# Patient Record
Sex: FEMALE | Race: WHITE | NOT HISPANIC OR LATINO
[De-identification: names, ages, dates, MRNs, and addresses within clinical notes are randomized per-mention and may not be internally consistent; named-entity substitution may affect disease eponyms.]

---

## 2018-10-11 ENCOUNTER — TRANSCRIPTION ENCOUNTER (OUTPATIENT)
Age: 63
End: 2018-10-11

## 2018-11-29 ENCOUNTER — RECORD ABSTRACTING (OUTPATIENT)
Age: 63
End: 2018-11-29

## 2018-11-29 DIAGNOSIS — Z80.7 FAMILY HISTORY OF OTHER MALIGNANT NEOPLASMS OF LYMPHOID, HEMATOPOIETIC AND RELATED TISSUES: ICD-10-CM

## 2018-11-29 DIAGNOSIS — F43.9 REACTION TO SEVERE STRESS, UNSPECIFIED: ICD-10-CM

## 2018-11-29 DIAGNOSIS — Z80.8 FAMILY HISTORY OF MALIGNANT NEOPLASM OF OTHER ORGANS OR SYSTEMS: ICD-10-CM

## 2018-12-17 ENCOUNTER — APPOINTMENT (OUTPATIENT)
Dept: INTERNAL MEDICINE | Facility: CLINIC | Age: 63
End: 2018-12-17
Payer: COMMERCIAL

## 2018-12-17 ENCOUNTER — NON-APPOINTMENT (OUTPATIENT)
Age: 63
End: 2018-12-17

## 2018-12-17 VITALS
DIASTOLIC BLOOD PRESSURE: 80 MMHG | WEIGHT: 150.1 LBS | HEIGHT: 64 IN | BODY MASS INDEX: 25.62 KG/M2 | SYSTOLIC BLOOD PRESSURE: 140 MMHG

## 2018-12-17 DIAGNOSIS — B00.9 HERPESVIRAL INFECTION, UNSPECIFIED: ICD-10-CM

## 2018-12-17 PROCEDURE — 85610 PROTHROMBIN TIME: CPT | Mod: QW

## 2018-12-17 PROCEDURE — 99214 OFFICE O/P EST MOD 30 MIN: CPT | Mod: 25

## 2018-12-17 PROCEDURE — 93000 ELECTROCARDIOGRAM COMPLETE: CPT

## 2018-12-17 PROCEDURE — 36415 COLL VENOUS BLD VENIPUNCTURE: CPT

## 2018-12-17 RX ORDER — TAMOXIFEN CITRATE 20 MG
20 TABLET ORAL DAILY
Refills: 0 | Status: DISCONTINUED | COMMUNITY
End: 2018-12-17

## 2018-12-17 NOTE — HISTORY OF PRESENT ILLNESS
[Aortic Stenosis] : no aortic stenosis [Atrial Fibrillation] : no atrial fibrillation [Coronary Artery Disease] : no coronary artery disease [Recent Myocardial Infarction] : no recent myocardial infarction [Implantable Device/Pacemaker] : no implantable device/pacemaker [Asthma] : no asthma [COPD] : no COPD [Sleep Apnea] : no sleep apnea [Smoker] : not a smoker [FreeTextEntry4] : Patient is a 63-year-old female presenting for preop clearance for cataract removal. This has well-being is good. She is eating well sleeping well. No night sweats. No chills. No fever. She has a past history of carcinoma of the breast greater than 10 years ago episodes of arthritis, largely related to trauma and a more recent sprain of her left ankle. She has no significant cardiac or pulmonary history G3, infectious disease this point in time.

## 2018-12-17 NOTE — ASSESSMENT
[Patient Optimized for Surgery] : Patient optimized for surgery [No Further Testing Recommended] : no further testing recommended [FreeTextEntry4] : EKG is within normal limits. Patient's free of infectious and cleared for anticipated procedure. [FreeTextEntry7] : EKG within normal limits. Blood work pending

## 2018-12-17 NOTE — PHYSICAL EXAM

## 2018-12-18 LAB
ALBUMIN SERPL ELPH-MCNC: 4.8 G/DL
ALP BLD-CCNC: 90 U/L
ALT SERPL-CCNC: 138 U/L
ANION GAP SERPL CALC-SCNC: 16 MMOL/L
APPEARANCE: CLEAR
APTT BLD: 35.8 SEC
AST SERPL-CCNC: 36 U/L
BASOPHILS # BLD AUTO: 0.03 K/UL
BASOPHILS NFR BLD AUTO: 0.5 %
BILIRUB SERPL-MCNC: 1.7 MG/DL
BILIRUBIN URINE: NEGATIVE
BLOOD URINE: ABNORMAL
BUN SERPL-MCNC: 17 MG/DL
CALCIUM SERPL-MCNC: 10 MG/DL
CHLORIDE SERPL-SCNC: 100 MMOL/L
CO2 SERPL-SCNC: 24 MMOL/L
COLOR: YELLOW
CREAT SERPL-MCNC: 0.59 MG/DL
EOSINOPHIL # BLD AUTO: 0.18 K/UL
EOSINOPHIL NFR BLD AUTO: 3.1 %
GLUCOSE QUALITATIVE U: NEGATIVE MG/DL
GLUCOSE SERPL-MCNC: 89 MG/DL
HCT VFR BLD CALC: 41.9 %
HGB BLD-MCNC: 13.6 G/DL
IMM GRANULOCYTES NFR BLD AUTO: 0.2 %
INR PPP: 0.98 RATIO
KETONES URINE: NEGATIVE
LEUKOCYTE ESTERASE URINE: ABNORMAL
LYMPHOCYTES # BLD AUTO: 1.46 K/UL
LYMPHOCYTES NFR BLD AUTO: 25.3 %
MAN DIFF?: NORMAL
MCHC RBC-ENTMCNC: 30.1 PG
MCHC RBC-ENTMCNC: 32.5 GM/DL
MCV RBC AUTO: 92.7 FL
MONOCYTES # BLD AUTO: 0.78 K/UL
MONOCYTES NFR BLD AUTO: 13.5 %
NEUTROPHILS # BLD AUTO: 3.32 K/UL
NEUTROPHILS NFR BLD AUTO: 57.4 %
NITRITE URINE: NEGATIVE
PH URINE: 6.5
PLATELET # BLD AUTO: 203 K/UL
POTASSIUM SERPL-SCNC: 3.5 MMOL/L
PROT SERPL-MCNC: 7.4 G/DL
PROTEIN URINE: NEGATIVE MG/DL
PT BLD: 11 SEC
RBC # BLD: 4.52 M/UL
RBC # FLD: 12.8 %
SODIUM SERPL-SCNC: 140 MMOL/L
SPECIFIC GRAVITY URINE: 1
UROBILINOGEN URINE: NEGATIVE MG/DL
WBC # FLD AUTO: 5.78 K/UL

## 2019-01-22 ENCOUNTER — RX RENEWAL (OUTPATIENT)
Age: 64
End: 2019-01-22

## 2020-01-27 ENCOUNTER — APPOINTMENT (OUTPATIENT)
Dept: INTERNAL MEDICINE | Facility: CLINIC | Age: 65
End: 2020-01-27
Payer: COMMERCIAL

## 2020-01-27 ENCOUNTER — NON-APPOINTMENT (OUTPATIENT)
Age: 65
End: 2020-01-27

## 2020-01-27 VITALS
HEIGHT: 64 IN | SYSTOLIC BLOOD PRESSURE: 112 MMHG | DIASTOLIC BLOOD PRESSURE: 80 MMHG | BODY MASS INDEX: 25.1 KG/M2 | WEIGHT: 147 LBS

## 2020-01-27 DIAGNOSIS — Z86.39 PERSONAL HISTORY OF OTHER ENDOCRINE, NUTRITIONAL AND METABOLIC DISEASE: ICD-10-CM

## 2020-01-27 DIAGNOSIS — M47.812 SPONDYLOSIS W/OUT MYELOPATHY OR RADICULOPATHY, CERVICAL REGION: ICD-10-CM

## 2020-01-27 PROCEDURE — 93000 ELECTROCARDIOGRAM COMPLETE: CPT

## 2020-01-27 PROCEDURE — 99396 PREV VISIT EST AGE 40-64: CPT | Mod: 25

## 2020-01-27 PROCEDURE — 36415 COLL VENOUS BLD VENIPUNCTURE: CPT

## 2020-01-27 NOTE — HISTORY OF PRESENT ILLNESS
[de-identified] : Patient is a 64-year-old female presenting for an annual exam. Her clinical status is essentially unchanged. She is healthy. She is eating well sleeping well. No night sweats. No chills. No fever. She presently is not taking tamoxifen for cancer of the breast, which is approximately 15 years ago. She is recently off of it related to her incidence of osteoporosis. Which he blames a number of aches and pains on. She is presently taking medications for the osteoporosis. Otherwise, her systems review is as 100%, negative. She's up-to-date on her colonoscopies and has had a hysterectomy.

## 2020-01-27 NOTE — HEALTH RISK ASSESSMENT
[Excellent] : ~his/her~  mood as  excellent [Yes] : Yes [No falls in past year] : Patient reported no falls in the past year [0] : 1) Little interest or pleasure doing things: Not at all (0)

## 2020-01-29 LAB
ALBUMIN SERPL ELPH-MCNC: 5 G/DL
ALP BLD-CCNC: 88 U/L
ALT SERPL-CCNC: 34 U/L
ANION GAP SERPL CALC-SCNC: 15 MMOL/L
APPEARANCE: CLEAR
AST SERPL-CCNC: 13 U/L
BASOPHILS # BLD AUTO: 0.04 K/UL
BASOPHILS NFR BLD AUTO: 0.6 %
BILIRUB SERPL-MCNC: 1.1 MG/DL
BILIRUBIN URINE: NEGATIVE
BLOOD URINE: ABNORMAL
BUN SERPL-MCNC: 18 MG/DL
CALCIUM SERPL-MCNC: 10.7 MG/DL
CHLORIDE SERPL-SCNC: 100 MMOL/L
CHOLEST SERPL-MCNC: 281 MG/DL
CHOLEST/HDLC SERPL: 3.8 RATIO
CO2 SERPL-SCNC: 26 MMOL/L
COLOR: YELLOW
CREAT SERPL-MCNC: 0.62 MG/DL
EOSINOPHIL # BLD AUTO: 0.16 K/UL
EOSINOPHIL NFR BLD AUTO: 2.3 %
GLUCOSE QUALITATIVE U: NEGATIVE
GLUCOSE SERPL-MCNC: 92 MG/DL
HCT VFR BLD CALC: 43.9 %
HDLC SERPL-MCNC: 73 MG/DL
HGB BLD-MCNC: 14.4 G/DL
IMM GRANULOCYTES NFR BLD AUTO: 0.4 %
KETONES URINE: NEGATIVE
LDLC SERPL CALC-MCNC: 172 MG/DL
LEUKOCYTE ESTERASE URINE: NEGATIVE
LYMPHOCYTES # BLD AUTO: 1.79 K/UL
LYMPHOCYTES NFR BLD AUTO: 25.9 %
MAN DIFF?: NORMAL
MCHC RBC-ENTMCNC: 30.2 PG
MCHC RBC-ENTMCNC: 32.8 GM/DL
MCV RBC AUTO: 92 FL
MONOCYTES # BLD AUTO: 0.76 K/UL
MONOCYTES NFR BLD AUTO: 11 %
NEUTROPHILS # BLD AUTO: 4.12 K/UL
NEUTROPHILS NFR BLD AUTO: 59.8 %
NITRITE URINE: NEGATIVE
PH URINE: 6.5
PLATELET # BLD AUTO: 192 K/UL
POTASSIUM SERPL-SCNC: 4 MMOL/L
PROT SERPL-MCNC: 7.4 G/DL
PROTEIN URINE: NORMAL
RBC # BLD: 4.77 M/UL
RBC # FLD: 12.1 %
SODIUM SERPL-SCNC: 141 MMOL/L
SPECIFIC GRAVITY URINE: 1.02
T4 FREE SERPL-MCNC: 1.2 NG/DL
TRIGL SERPL-MCNC: 174 MG/DL
TSH SERPL-ACNC: 1.55 UIU/ML
UROBILINOGEN URINE: NORMAL
WBC # FLD AUTO: 6.9 K/UL

## 2020-02-11 ENCOUNTER — TRANSCRIPTION ENCOUNTER (OUTPATIENT)
Age: 65
End: 2020-02-11

## 2020-02-19 LAB
APPEARANCE: CLEAR
BILIRUBIN URINE: NEGATIVE
BLOOD URINE: ABNORMAL
COLOR: YELLOW
GLUCOSE QUALITATIVE U: NEGATIVE
KETONES URINE: NEGATIVE
LEUKOCYTE ESTERASE URINE: NEGATIVE
NITRITE URINE: NEGATIVE
PH URINE: 6.5
PROTEIN URINE: NORMAL
SPECIFIC GRAVITY URINE: 1.01
UROBILINOGEN URINE: NORMAL

## 2020-02-20 LAB — BACTERIA UR CULT: NORMAL

## 2021-02-03 ENCOUNTER — APPOINTMENT (OUTPATIENT)
Dept: INTERNAL MEDICINE | Facility: CLINIC | Age: 66
End: 2021-02-03
Payer: COMMERCIAL

## 2021-02-03 VITALS
WEIGHT: 147 LBS | HEIGHT: 64 IN | SYSTOLIC BLOOD PRESSURE: 144 MMHG | BODY MASS INDEX: 25.1 KG/M2 | DIASTOLIC BLOOD PRESSURE: 74 MMHG

## 2021-02-03 DIAGNOSIS — G44.209 TENSION-TYPE HEADACHE, UNSPECIFIED, NOT INTRACTABLE: ICD-10-CM

## 2021-02-03 PROCEDURE — 99213 OFFICE O/P EST LOW 20 MIN: CPT | Mod: 25

## 2021-02-03 PROCEDURE — 99072 ADDL SUPL MATRL&STAF TM PHE: CPT

## 2021-02-03 PROCEDURE — 36415 COLL VENOUS BLD VENIPUNCTURE: CPT

## 2021-02-03 NOTE — ASSESSMENT
[FreeTextEntry1] : Patient was originally concerned about her blood, pressure she's not had blood pressure in the past and at this time. Her initial blood pressure was 144 systolic is still significantly to 112/70. Much of her symptomatology is related to domestic stress. This was discussed at length.

## 2021-02-03 NOTE — HISTORY OF PRESENT ILLNESS
[FreeTextEntry8] : Patient is a 65-year-old female presenting complaining of fatigue, headaches, insomnia, and difficulty working. She has difficulty sleeping. She wakes up short of breath with chest pressure, largely related to stress. She is an extremely difficult living situation at this point in time. This was discussed at length. Otherwise constitutionally, she is eating well. No night sweats. No chills. No fever. No changes in her appetite and her weight is stable. She is very much concerned about the difficulties that she is having in the house. Blood work being drawn as screening.

## 2021-02-05 LAB
ALBUMIN SERPL ELPH-MCNC: 5.2 G/DL
ALP BLD-CCNC: 72 U/L
ALT SERPL-CCNC: 66 U/L
ANION GAP SERPL CALC-SCNC: 15 MMOL/L
AST SERPL-CCNC: 24 U/L
BASOPHILS # BLD AUTO: 0.04 K/UL
BASOPHILS NFR BLD AUTO: 0.6 %
BILIRUB SERPL-MCNC: 1.8 MG/DL
BUN SERPL-MCNC: 20 MG/DL
CALCIUM SERPL-MCNC: 9.9 MG/DL
CHLORIDE SERPL-SCNC: 104 MMOL/L
CHOLEST SERPL-MCNC: 343 MG/DL
CO2 SERPL-SCNC: 23 MMOL/L
CREAT SERPL-MCNC: 0.65 MG/DL
EOSINOPHIL # BLD AUTO: 0.17 K/UL
EOSINOPHIL NFR BLD AUTO: 2.7 %
ERYTHROCYTE [SEDIMENTATION RATE] IN BLOOD BY WESTERGREN METHOD: 47 MM/HR
GLUCOSE SERPL-MCNC: 88 MG/DL
HCT VFR BLD CALC: 44.6 %
HDLC SERPL-MCNC: 76 MG/DL
HGB BLD-MCNC: 14.1 G/DL
IMM GRANULOCYTES NFR BLD AUTO: 0.3 %
LDLC SERPL CALC-MCNC: 240 MG/DL
LYMPHOCYTES # BLD AUTO: 1.58 K/UL
LYMPHOCYTES NFR BLD AUTO: 24.8 %
MAN DIFF?: NORMAL
MCHC RBC-ENTMCNC: 29.3 PG
MCHC RBC-ENTMCNC: 31.6 GM/DL
MCV RBC AUTO: 92.7 FL
MONOCYTES # BLD AUTO: 0.71 K/UL
MONOCYTES NFR BLD AUTO: 11.1 %
NEUTROPHILS # BLD AUTO: 3.86 K/UL
NEUTROPHILS NFR BLD AUTO: 60.5 %
NONHDLC SERPL-MCNC: 267 MG/DL
PLATELET # BLD AUTO: 205 K/UL
POTASSIUM SERPL-SCNC: 4.1 MMOL/L
PROT SERPL-MCNC: 8.1 G/DL
RBC # BLD: 4.81 M/UL
RBC # FLD: 12.5 %
SODIUM SERPL-SCNC: 142 MMOL/L
T4 FREE SERPL-MCNC: 1.3 NG/DL
TRIGL SERPL-MCNC: 139 MG/DL
TSH SERPL-ACNC: 1.71 UIU/ML
WBC # FLD AUTO: 6.38 K/UL

## 2021-02-08 ENCOUNTER — APPOINTMENT (OUTPATIENT)
Dept: INTERNAL MEDICINE | Facility: CLINIC | Age: 66
End: 2021-02-08

## 2021-02-08 ENCOUNTER — APPOINTMENT (OUTPATIENT)
Dept: FAMILY MEDICINE | Facility: CLINIC | Age: 66
End: 2021-02-08

## 2021-02-16 ENCOUNTER — APPOINTMENT (OUTPATIENT)
Dept: INTERNAL MEDICINE | Facility: CLINIC | Age: 66
End: 2021-02-16
Payer: COMMERCIAL

## 2021-02-16 PROCEDURE — 99072 ADDL SUPL MATRL&STAF TM PHE: CPT

## 2021-02-16 PROCEDURE — 36415 COLL VENOUS BLD VENIPUNCTURE: CPT

## 2021-02-17 LAB
ALBUMIN SERPL ELPH-MCNC: 4.6 G/DL
ALP BLD-CCNC: 67 U/L
ALT SERPL-CCNC: 47 U/L
ANION GAP SERPL CALC-SCNC: 14 MMOL/L
AST SERPL-CCNC: 16 U/L
BILIRUB SERPL-MCNC: 1.3 MG/DL
BUN SERPL-MCNC: 18 MG/DL
CALCIUM SERPL-MCNC: 10.1 MG/DL
CHLORIDE SERPL-SCNC: 102 MMOL/L
CO2 SERPL-SCNC: 26 MMOL/L
CREAT SERPL-MCNC: 0.8 MG/DL
CRP SERPL-MCNC: 0.13 MG/DL
ERYTHROCYTE [SEDIMENTATION RATE] IN BLOOD BY WESTERGREN METHOD: 26 MM/HR
GLUCOSE SERPL-MCNC: 98 MG/DL
MITOCHONDRIA AB SER IF-ACNC: NORMAL
POTASSIUM SERPL-SCNC: 4.2 MMOL/L
PROT SERPL-MCNC: 7.1 G/DL
SMOOTH MUSCLE AB SER QL IF: NORMAL
SODIUM SERPL-SCNC: 142 MMOL/L

## 2021-03-02 ENCOUNTER — RX CHANGE (OUTPATIENT)
Age: 66
End: 2021-03-02

## 2021-03-02 RX ORDER — SIMVASTATIN 20 MG/1
20 TABLET, FILM COATED ORAL
Qty: 30 | Refills: 5 | Status: DISCONTINUED | COMMUNITY
Start: 2021-02-05 | End: 2021-03-02

## 2021-10-01 ENCOUNTER — NON-APPOINTMENT (OUTPATIENT)
Age: 66
End: 2021-10-01

## 2021-10-22 ENCOUNTER — TRANSCRIPTION ENCOUNTER (OUTPATIENT)
Age: 66
End: 2021-10-22

## 2021-11-02 ENCOUNTER — APPOINTMENT (OUTPATIENT)
Dept: HEMATOLOGY ONCOLOGY | Facility: CLINIC | Age: 66
End: 2021-11-02
Payer: COMMERCIAL

## 2021-11-02 ENCOUNTER — RESULT REVIEW (OUTPATIENT)
Age: 66
End: 2021-11-02

## 2021-11-02 VITALS
RESPIRATION RATE: 16 BRPM | WEIGHT: 148.4 LBS | TEMPERATURE: 97.2 F | BODY MASS INDEX: 25.33 KG/M2 | SYSTOLIC BLOOD PRESSURE: 112 MMHG | HEIGHT: 64 IN | DIASTOLIC BLOOD PRESSURE: 68 MMHG | HEART RATE: 76 BPM | OXYGEN SATURATION: 98 %

## 2021-11-02 DIAGNOSIS — Z78.9 OTHER SPECIFIED HEALTH STATUS: ICD-10-CM

## 2021-11-02 PROCEDURE — 99205 OFFICE O/P NEW HI 60 MIN: CPT | Mod: 25

## 2021-11-02 PROCEDURE — 36415 COLL VENOUS BLD VENIPUNCTURE: CPT

## 2021-11-02 RX ORDER — SIMVASTATIN 20 MG/1
20 TABLET, FILM COATED ORAL
Qty: 90 | Refills: 2 | Status: COMPLETED | COMMUNITY
Start: 2021-03-02 | End: 2021-11-02

## 2021-11-05 ENCOUNTER — NON-APPOINTMENT (OUTPATIENT)
Age: 66
End: 2021-11-05

## 2021-11-12 ENCOUNTER — APPOINTMENT (OUTPATIENT)
Dept: GERIATRICS | Facility: CLINIC | Age: 66
End: 2021-11-12
Payer: COMMERCIAL

## 2021-11-12 PROCEDURE — 99205 OFFICE O/P NEW HI 60 MIN: CPT | Mod: 95

## 2021-11-12 RX ORDER — LORAZEPAM 0.5 MG/1
0.5 TABLET ORAL
Qty: 90 | Refills: 0 | Status: ACTIVE | COMMUNITY
Start: 1900-01-01 | End: 1900-01-01

## 2021-11-12 RX ORDER — FLUTICASONE PROPIONATE 50 UG/1
50 SPRAY, METERED NASAL DAILY
Qty: 1 | Refills: 3 | Status: DISCONTINUED | COMMUNITY
End: 2021-11-12

## 2021-11-12 RX ORDER — FAMCICLOVIR 500 MG/1
500 TABLET, FILM COATED ORAL 3 TIMES DAILY
Qty: 90 | Refills: 1 | Status: DISCONTINUED | COMMUNITY
Start: 2018-12-17 | End: 2021-11-12

## 2021-11-12 NOTE — HISTORY OF PRESENT ILLNESS
[FreeTextEntry1] : Zelda Waddell is a 65 y/o F w/ PMH stage IIA breast CA (dx '05, s/p b/l mastectomy, partial hysterectomy, BSO, chemo, RT, and tamoxifen x 10 years, osteoporosis, who presents today the [facility] primary palliative care for "pain and anxiety."\par \to Reports since the time of her chemo, her legs are very restless. She uses a lotion that works quite well for this. Uses it for anxiety 2/2 relationship with her daughter, and OA pain in hips and knees - has tried pills, tea, etc. Also takes Ativan very occasionally (every few weeks/months). Current RX is . \par \to Now has a lot of pain in hips when walking and uses exercise bike. Feels pain in the "ball joint" at the top of the hip. No recent imaging. \par amaris Uses [facility] in [place]. \par amaris Lives with her  and 22 y/o daughter. Daughter has severe OCD and "outbursts" that cause pt extreme anxiety, manifesting as chest pain. She has undergone a full cardiac workup, which was negative- chest pain thought to be related to anxiety.

## 2021-11-12 NOTE — PHYSICAL EXAM
[General Appearance - Alert] : alert [General Appearance - In No Acute Distress] : in no acute distress [General Appearance - Well Nourished] : well nourished [General Appearance - Well Developed] : well developed [Normal Oral Mucosa] : normal oral mucosa [No Oral Pallor] : no oral pallor [Hearing Threshold Finger Rub Not [place]] : hearing was normal [Neck Appearance] : the appearance of the neck was normal [Neck Cervical Mass (___cm)] : no neck mass was observed [Jugular Venous Distention Increased] : there was no jugular-venous distention [Respiration, Rhythm And Depth] : normal respiratory rhythm and effort [Exaggerated Use Of Accessory Muscles For Inspiration] : no accessory muscle use [] : no rash [Skin Lesions] : no skin lesions [Oriented To Time, Place, And Person] : oriented to person, place, and time [Impaired Insight] : insight and judgment were intact [Affect] : the affect was normal [Mood] : the mood was normal

## 2021-11-12 NOTE — REASON FOR VISIT
[Initial Evaluation] : an initial evaluation [Home] : at home, [unfilled] , at the time of the visit. [Medical Office: ([facility])___] : at the medical office located in  [Verbal consent obtained from patient] : the patient, [unfilled]

## 2021-11-12 NOTE — ASSESSMENT
[FreeTextEntry1] :  65 y/o F w/ PMH stage IIA breast CA in '05 s/p surgery/chemo/XRT/tamoxifen, OP seen to establish primary palliative care\par \par # Anxiety\par - DIscussed at length benefits of adjuvant therapy in addition to cannabis and Ativan- she has seen in the past and will consider going again. Referrals made to [facility] and [facility]\par - Re-certified for MM- [id number]\par - Renewed Ativan- iSTOP [id number]\par \par # ? OA? \par - MM\par - Referred to ortho- may benefit from injections. Needs new imaging\par \par Discussed social situation at length- her 85 year-old mother was also recently diagnoses with breast cancer (pending staging) 2 weeks ago. She may refer her to palliative care as well. \par \par RTC in 3 months or PRN

## 2021-11-28 NOTE — ASSESSMENT
[FreeTextEntry1] : 66 year old female presents today for initial consultation of a previous history of breast cancer, referred by Dr. Tenorio.  Patient was diagnosed approximately 15 years ago (2005) with breast cancer and BRCA 2 mutation.  She was diagnosed with stage IIB ER/IL positive, Her 2 negative IDC.  S/p adjuvant chemotherapy w dd/ AC x 4 and taxol x 4 followed b RT and tamoxifen x 10 years. \par \par She subsequently tested positive for BRCA 2 mutation and on 12/20/2005 underwent bilateral mastectomy with reconstruction and partial hysterectomy and BSO all benign.   \par \par Completed Tamoxifen 2018\par \par BMD Dec 2019- osteopenia T-score -2.4 on oral Ibandronate q month - due for dexa\par \par Patient suffers from anxiety- palliative care evaluation.

## 2021-11-28 NOTE — PHYSICAL EXAM
[Fully active, able to carry on all pre-disease performance without restriction] : Status 0 - Fully active, able to carry on all pre-disease performance without restriction [Normal] : affect appropriate [de-identified] : mastectomy

## 2021-11-28 NOTE — REVIEW OF SYSTEMS
[Fatigue] : fatigue [Joint Pain] : joint pain [Joint Stiffness] : joint stiffness [Muscle Pain] : muscle pain [Anxiety] : anxiety [Negative] : Allergic/Immunologic

## 2021-11-28 NOTE — HISTORY OF PRESENT ILLNESS
[Disease: _____________________] : Disease: [unfilled] [AJCC Stage: ____] : AJCC Stage: [unfilled] [de-identified] : 66 year old female presents today for initial consultation of a previous history of breast cancer, referred by Dr. Tenorio.  Patient was diagnosed approximately 15 years ago () with breast cancer and BRCA 2 mutation.  She was diagnosed with stage IIB ER/ID positive, Her 2 negative IDC.  S/p adjuvant chemotherapy w dd/ AC x 4 and taxol x 4 followed b RT and tamoxifen x 10 years. \par \par She subsequently tested positive for BRCA 2 mutation and on 2005 underwent bilateral mastectomy with reconstruction and partial hysterectomy and BSO all benign.   \par \par Completed Tamoxifen \par \par BMD Dec 2019- osteopenia T-score -2.4 on oral Ibandronate q month \par \par \par Risk Factors:\par Age at menarche- 16\par Age at menopause- partial hysterectomy with bilateral oophorectomy \par G6, P3 ( miscarriages)\par Age at first live birth- 28\par OCP- yes\par HRT- denies\par Family History- father with MM  at age 70, paternal uncle  of lung cancer, PGGM stomach cancer, sister and brother and 2 nephews BRCA positive \par Genetics- BRCA 2 positive ()\par Smoker-denies\par Ashkenazi Orthodox- yes   [de-identified] : BRCA2

## 2022-01-18 ENCOUNTER — APPOINTMENT (OUTPATIENT)
Dept: HEMATOLOGY ONCOLOGY | Facility: CLINIC | Age: 67
End: 2022-01-18

## 2022-02-19 ENCOUNTER — RESULT REVIEW (OUTPATIENT)
Age: 67
End: 2022-02-19

## 2022-03-03 ENCOUNTER — RESULT REVIEW (OUTPATIENT)
Age: 67
End: 2022-03-03

## 2022-03-09 ENCOUNTER — NON-APPOINTMENT (OUTPATIENT)
Age: 67
End: 2022-03-09

## 2022-03-14 ENCOUNTER — RESULT REVIEW (OUTPATIENT)
Age: 67
End: 2022-03-14

## 2022-04-04 ENCOUNTER — RESULT REVIEW (OUTPATIENT)
Age: 67
End: 2022-04-04

## 2022-04-04 ENCOUNTER — APPOINTMENT (OUTPATIENT)
Dept: HEMATOLOGY ONCOLOGY | Facility: CLINIC | Age: 67
End: 2022-04-04
Payer: COMMERCIAL

## 2022-04-04 VITALS
HEART RATE: 73 BPM | BODY MASS INDEX: 25.83 KG/M2 | SYSTOLIC BLOOD PRESSURE: 135 MMHG | HEIGHT: 64.02 IN | RESPIRATION RATE: 16 BRPM | TEMPERATURE: 98 F | WEIGHT: 151.3 LBS | DIASTOLIC BLOOD PRESSURE: 77 MMHG | OXYGEN SATURATION: 98 %

## 2022-04-04 PROCEDURE — 36415 COLL VENOUS BLD VENIPUNCTURE: CPT

## 2022-04-04 PROCEDURE — 99214 OFFICE O/P EST MOD 30 MIN: CPT | Mod: 25

## 2022-04-04 NOTE — ASSESSMENT
[FreeTextEntry1] : 67 year old female seen for consulation with history of breast cancer, referred by Dr. Davis.  Patient was diagnosed approximately 15 years ago (2005) with breast cancer and BRCA 2 mutation.  She was diagnosed with stage IIB ER/OH positive, Her 2 negative IDC.  S/p adjuvant chemotherapy w dd/ AC x 4 and taxol x 4 followed b RT and tamoxifen x 10 years. \par \par She subsequently tested positive for BRCA 2 mutation and on 12/20/2005 underwent bilateral mastectomy with reconstruction and partial hysterectomy and BSO all benign.   \par Silicone reconstruction - MRI March 2022- GREGORY, silicone implants intact. \par Completed Tamoxifen 2018\par \par last bone density March 2022 \par T-score - 2.5\par Risk factors: AI, postmenopausal \par Recommended:\par 1. Vitamin D\par 2. Calcium supplement 500mg\par 3. Weight bearing exercises\par h/o ibandronate\par prolia 4/22\par dental care q 6 months \par Ifex ordered\par \par Patient increased bone pains- possible 2nd to statins\par \par Patient suffers from anxiety- palliative care evaluation, uses CBD intermittently.  \par \par Patient in pancreatic screening program MRI and endoscopic ultrasound with digestive disease of [place] [phone] at [facility] \par \par Blood drawn in office. Ordered and reviewed.\par

## 2022-04-04 NOTE — HISTORY OF PRESENT ILLNESS
[Disease: _____________________] : Disease: [unfilled] [AJCC Stage: ____] : AJCC Stage: [unfilled] [de-identified] : 66 year old female presents today for initial consultation of a previous history of breast cancer, referred by Dr. Tenorio.  Patient was diagnosed approximately 15 years ago () with breast cancer and BRCA 2 mutation.  She was diagnosed with stage IIB ER/SD positive, Her 2 negative IDC.  S/p adjuvant chemotherapy w dd/ AC x 4 and taxol x 4 followed b RT and tamoxifen x 10 years. \par \par She subsequently tested positive for BRCA 2 mutation and on 2005 underwent bilateral mastectomy with reconstruction and partial hysterectomy and BSO all benign.   \par \par Completed Tamoxifen \par \par BMD Dec 2019- osteopenia T-score -2.4 on oral Ibandronate q month \par \par \par Risk Factors:\par Age at menarche- 16\par Age at menopause- partial hysterectomy with bilateral oophorectomy \par G6, P3 ( miscarriages)\par Age at first live birth- 28\par OCP- yes\par HRT- denies\par Family History- father with MM  at age 70, paternal uncle  of lung cancer, PGGM stomach cancer, sister and brother and 2 nephews BRCA positive \par Genetics- BRCA 2 positive ()\par Smoker-denies\par Ashkenazi Muslim- yes   [de-identified] : BRCA2 [de-identified] : Patient is here is follow up for breast cancer. She had a lithotripsy back in Jan 2022 and she recently had a MRI of her pancreas at [facility] with [facility] on Feb 21st, 2022 for a study. Recently the patient complains of bone pains localized mostly in the hips, thighs and shoulders which was more noticeable when she was taking Ibandronate. She was also recently placed on a statin for her cholesterol which she states contributed to more bone and joint pain. She was switched to another statin which was tolerated better.

## 2022-04-04 NOTE — PHYSICAL EXAM
[Fully active, able to carry on all pre-disease performance without restriction] : Status 0 - Fully active, able to carry on all pre-disease performance without restriction [Normal] : affect appropriate [de-identified] : mastectomy

## 2022-05-13 ENCOUNTER — APPOINTMENT (OUTPATIENT)
Dept: GERIATRICS | Facility: CLINIC | Age: 67
End: 2022-05-13

## 2022-10-03 ENCOUNTER — RESULT REVIEW (OUTPATIENT)
Age: 67
End: 2022-10-03

## 2022-10-03 ENCOUNTER — APPOINTMENT (OUTPATIENT)
Dept: HEMATOLOGY ONCOLOGY | Facility: CLINIC | Age: 67
End: 2022-10-03

## 2022-10-03 VITALS
OXYGEN SATURATION: 99 % | DIASTOLIC BLOOD PRESSURE: 70 MMHG | HEIGHT: 64.02 IN | WEIGHT: 152 LBS | SYSTOLIC BLOOD PRESSURE: 129 MMHG | TEMPERATURE: 96.8 F | BODY MASS INDEX: 25.95 KG/M2 | HEART RATE: 70 BPM | RESPIRATION RATE: 16 BRPM

## 2022-10-03 DIAGNOSIS — Z00.00 ENCOUNTER FOR GENERAL ADULT MEDICAL EXAMINATION W/OUT ABNORMAL FINDINGS: ICD-10-CM

## 2022-10-03 DIAGNOSIS — M79.10 MYALGIA, UNSPECIFIED SITE: ICD-10-CM

## 2022-10-03 PROCEDURE — 99214 OFFICE O/P EST MOD 30 MIN: CPT | Mod: 25

## 2022-10-03 PROCEDURE — 36415 COLL VENOUS BLD VENIPUNCTURE: CPT

## 2022-10-03 NOTE — ASSESSMENT
[FreeTextEntry1] : 67 year old female seen for consultation with history of breast cancer, referred by Dr. Davis.  \par Patient was diagnosed approximately 15 years ago (2005) with breast cancer and BRCA 2 mutation.  She was diagnosed with stage IIB ER/VT positive, Her 2 negative IDC.  S/p adjuvant chemotherapy w dd/ AC x 4 and taxol x 4 followed b RT and tamoxifen x 10 years. \par \par She subsequently tested positive for BRCA 2 mutation and on 12/20/2005 underwent bilateral mastectomy with reconstruction and partial hysterectomy and BSO all benign.   \par Silicone reconstruction - MRI March 2022- GREGORY, silicone implants intact. \par Completed Tamoxifen 2018\par \par last bone density March 2022 \par T-score - 2.5\par Risk factors: AI, postmenopausal \par Recommended:\par 1. Vitamin D\par 2. Calcium supplement 500mg\par 3. Weight bearing exercises\par h/o ibandronate\par prolia 4/22, 10/22\par dental care q 6 months \par Ifex ordered\par \par S/p fall - fx hand, recovering now.\par \par Patient increased bone pains- possible 2nd to statins\par \par Patient suffers from anxiety- palliative care evaluation, uses CBD intermittently.  \par \par Patient in pancreatic screening program MRI and endoscopic ultrasound with digestive disease of [place] [phone] at [facility] \par \par Blood drawn in office. Ordered and reviewed.\par

## 2022-10-03 NOTE — PHYSICAL EXAM
[Fully active, able to carry on all pre-disease performance without restriction] : Status 0 - Fully active, able to carry on all pre-disease performance without restriction [Normal] : affect appropriate [de-identified] : mastectomy

## 2022-10-03 NOTE — HISTORY OF PRESENT ILLNESS
[Disease: _____________________] : Disease: [unfilled] [AJCC Stage: ____] : AJCC Stage: [unfilled] [de-identified] : 66 year old female presents today for initial consultation of a previous history of breast cancer, referred by Dr. Tenorio.  Patient was diagnosed approximately 15 years ago () with breast cancer and BRCA 2 mutation.  She was diagnosed with stage IIB ER/IA positive, Her 2 negative IDC.  S/p adjuvant chemotherapy w dd/ AC x 4 and taxol x 4 followed b RT and tamoxifen x 10 years. \par \par She subsequently tested positive for BRCA 2 mutation and on 2005 underwent bilateral mastectomy with reconstruction and partial hysterectomy and BSO all benign.   \par \par Completed Tamoxifen \par \par BMD Dec 2019- osteopenia T-score -2.4 on oral Ibandronate q month \par \par \par Risk Factors:\par Age at menarche- 16\par Age at menopause- partial hysterectomy with bilateral oophorectomy \par G6, P3 ( miscarriages)\par Age at first live birth- 28\par OCP- yes\par HRT- denies\par Family History- father with MM  at age 70, paternal uncle  of lung cancer, PGGM stomach cancer, sister and brother and 2 nephews BRCA positive \par Genetics- BRCA 2 positive ()\par Smoker-denies\par Ashkenazi [employer]- yes   [de-identified] : BRCA2 [de-identified] : Patient is here is follow up for breast cancer. She had a lithotripsy back in Jan 2022 and she recently had a MRI of her pancreas at [facility] on Feb 21st, 2022 for a study. Recently the patient complains of bone pains localized mostly in the hips, thighs and shoulders which was more noticeable when she was taking Ibandronate. She was also recently placed on a statin for her cholesterol which she states contributed to more bone and joint pain. She was switched to another statin which was tolerated better.

## 2022-10-19 ENCOUNTER — RESULT REVIEW (OUTPATIENT)
Age: 67
End: 2022-10-19

## 2022-10-19 ENCOUNTER — APPOINTMENT (OUTPATIENT)
Dept: HEMATOLOGY ONCOLOGY | Facility: CLINIC | Age: 67
End: 2022-10-19

## 2022-10-19 ENCOUNTER — NON-APPOINTMENT (OUTPATIENT)
Age: 67
End: 2022-10-19

## 2022-10-27 ENCOUNTER — RESULT REVIEW (OUTPATIENT)
Age: 67
End: 2022-10-27

## 2022-10-27 ENCOUNTER — APPOINTMENT (OUTPATIENT)
Dept: NEPHROLOGY | Facility: CLINIC | Age: 67
End: 2022-10-27

## 2022-10-27 VITALS
WEIGHT: 149 LBS | SYSTOLIC BLOOD PRESSURE: 120 MMHG | DIASTOLIC BLOOD PRESSURE: 70 MMHG | BODY MASS INDEX: 25.44 KG/M2 | HEART RATE: 82 BPM | TEMPERATURE: 96.7 F | HEIGHT: 64 IN | OXYGEN SATURATION: 97 %

## 2022-10-27 DIAGNOSIS — E55.9 VITAMIN D DEFICIENCY, UNSPECIFIED: ICD-10-CM

## 2022-10-27 PROCEDURE — 99204 OFFICE O/P NEW MOD 45 MIN: CPT

## 2022-10-27 RX ORDER — IBANDRONATE SODIUM 150 MG/1
150 TABLET ORAL
Refills: 0 | Status: DISCONTINUED | COMMUNITY
Start: 2021-11-12 | End: 2022-10-27

## 2022-10-27 RX ORDER — ROSUVASTATIN CALCIUM 10 MG/1
10 TABLET, FILM COATED ORAL
Refills: 0 | Status: DISCONTINUED | COMMUNITY
End: 2022-10-27

## 2022-10-31 ENCOUNTER — APPOINTMENT (OUTPATIENT)
Dept: HEMATOLOGY ONCOLOGY | Facility: CLINIC | Age: 67
End: 2022-10-31

## 2022-10-31 PROCEDURE — 99213 OFFICE O/P EST LOW 20 MIN: CPT | Mod: 95

## 2022-10-31 NOTE — PHYSICAL EXAM
[Fully active, able to carry on all pre-disease performance without restriction] : Status 0 - Fully active, able to carry on all pre-disease performance without restriction [Normal] : affect appropriate [de-identified] : mastectomy

## 2022-10-31 NOTE — ASSESSMENT
[FreeTextEntry1] : 67 year old female seen for consultation with history of breast cancer, referred by Dr. Davis.  \par Patient was diagnosed approximately 15 years ago (2005) with breast cancer and BRCA 2 mutation.  She was diagnosed with stage IIB ER/KS positive, Her 2 negative IDC.  S/p adjuvant chemotherapy w dd/ AC x 4 and taxol x 4 followed b RT and tamoxifen x 10 years. \par \par She subsequently tested positive for BRCA 2 mutation and on 12/20/2005 underwent bilateral mastectomy with reconstruction and partial hysterectomy and BSO all benign.   \par Silicone reconstruction - MRI March 2022- GREGORY, silicone implants intact. \par Completed Tamoxifen 2018\par \par last bone density March 2022 \par T-score - 2.5\par Risk factors: AI, postmenopausal \par Recommended:\par 1. Vitamin D\par 2. Calcium supplement 500mg\par 3. Weight bearing exercises\par h/o ibandronate\par prolia 4/22, 10/22\par dental care q 6 months \par Ifex ordered\par \par S/p fall - fx hand, recovering now.\par \par Patient increased bone pains- possible 2nd to statins\par Evaluated with immunofixation/immunoglobins, urine revealed nephrotic range proteinuria. Proteins are polyclonal, no evidence of BJ protein, patient seen and evaluated by Dr. Charlton.\par She c/o lower abdominal pain and had pink urine this am, had kidneys stones in the past that required lithotripsy.  SHe is en route now but explained that if she develops fever or chills needs to report to ER.\par Patient suffers from anxiety- palliative care evaluation, uses CBD intermittently.  \par \par Patient in pancreatic screening program MRI and endoscopic ultrasound with digestive disease of [place] [phone] at [facility] \par \par Blood drawn in office. Ordered and reviewed.\par

## 2022-10-31 NOTE — HISTORY OF PRESENT ILLNESS
[Disease: _____________________] : Disease: [unfilled] [AJCC Stage: ____] : AJCC Stage: [unfilled] [Home] : at home, [unfilled] , at the time of the visit. [Medical Office: ([facility])___] : at the medical office located in  [Verbal consent obtained from patient] : the patient, [unfilled] [de-identified] : 66 year old female presents today for initial consultation of a previous history of breast cancer, referred by Dr. Tenorio.  Patient was diagnosed approximately 15 years ago () with breast cancer and BRCA 2 mutation.  She was diagnosed with stage IIB ER/NE positive, Her 2 negative IDC.  S/p adjuvant chemotherapy w dd/ AC x 4 and taxol x 4 followed b RT and tamoxifen x 10 years. \par \par She subsequently tested positive for BRCA 2 mutation and on 2005 underwent bilateral mastectomy with reconstruction and partial hysterectomy and BSO all benign.   \par \par Completed Tamoxifen \par \par BMD Dec 2019- osteopenia T-score -2.4 on oral Ibandronate q month \par \par \par Risk Factors:\par Age at menarche- 16\par Age at menopause- partial hysterectomy with bilateral oophorectomy \par G6, P3 ( miscarriages)\par Age at first live birth- 28\par OCP- yes\par HRT- denies\par Family History- father with MM  at age 70, paternal uncle  of lung cancer, PGGM stomach cancer, sister and brother and 2 nephews BRCA positive \par Genetics- BRCA 2 positive ()\par Smoker-denies\par Ashkenazi [employer]- yes   [de-identified] : BRCA2 [de-identified] : Patient is here is follow up for breast cancer. She had a lithotripsy back in Jan 2022 and she recently had a MRI of her pancreas at [facility] associated with [facility] on Feb 21st, 2022 for a study. Recently the patient complains of bone pains localized mostly in the hips, thighs and shoulders which was more noticeable when she was taking Ibandronate. She was also recently placed on a statin for her cholesterol which she states contributed to more bone and joint pain. She was switched to another statin which was tolerated better.

## 2022-11-03 ENCOUNTER — RESULT REVIEW (OUTPATIENT)
Age: 67
End: 2022-11-03

## 2022-11-03 ENCOUNTER — LABORATORY RESULT (OUTPATIENT)
Age: 67
End: 2022-11-03

## 2022-11-03 ENCOUNTER — APPOINTMENT (OUTPATIENT)
Dept: NEPHROLOGY | Facility: CLINIC | Age: 67
End: 2022-11-03

## 2022-11-03 DIAGNOSIS — R80.9 PROTEINURIA, UNSPECIFIED: ICD-10-CM

## 2022-11-03 PROCEDURE — 36415 COLL VENOUS BLD VENIPUNCTURE: CPT

## 2022-11-03 PROCEDURE — P9615: CPT

## 2022-11-04 PROBLEM — R80.9 PROTEINURIA, UNSPECIFIED TYPE: Status: ACTIVE | Noted: 2022-10-27

## 2022-11-08 LAB
25(OH)D3 SERPL-MCNC: 50.6 NG/ML
ALBUMIN SERPL ELPH-MCNC: 5 G/DL
ALP BLD-CCNC: 61 U/L
ALT SERPL-CCNC: 47 U/L
ANION GAP SERPL CALC-SCNC: 14 MMOL/L
ASO AB SER LA-ACNC: 113 IU/ML
AST SERPL-CCNC: 24 U/L
BILIRUB SERPL-MCNC: 1.4 MG/DL
BUN SERPL-MCNC: 18 MG/DL
C3 SERPL-MCNC: 126 MG/DL
C4 SERPL-MCNC: 26 MG/DL
CALCIUM SERPL-MCNC: 9.7 MG/DL
CALCIUM SERPL-MCNC: 9.7 MG/DL
CHLORIDE SERPL-SCNC: 103 MMOL/L
CHOLEST SERPL-MCNC: 253 MG/DL
CK SERPL-CCNC: 68 U/L
CO2 SERPL-SCNC: 23 MMOL/L
CREAT SERPL-MCNC: 0.71 MG/DL
EGFR: 94 ML/MIN/1.73M2
GLUCOSE SERPL-MCNC: 105 MG/DL
HDLC SERPL-MCNC: 77 MG/DL
LDLC SERPL CALC-MCNC: 153 MG/DL
MAGNESIUM SERPL-MCNC: 2.4 MG/DL
NONHDLC SERPL-MCNC: 177 MG/DL
PARATHYROID HORMONE INTACT: 39 PG/ML
PHOSPHATE SERPL-MCNC: 2.8 MG/DL
POTASSIUM SERPL-SCNC: 4.2 MMOL/L
PROT SERPL-MCNC: 7.2 G/DL
RHEUMATOID FACT SER QL: 21 IU/ML
SODIUM SERPL-SCNC: 140 MMOL/L
TRIGL SERPL-MCNC: 116 MG/DL
URATE SERPL-MCNC: 2.5 MG/DL

## 2022-11-10 LAB
ANA SER IF-ACNC: NEGATIVE
CYSTATIN C SERPL-MCNC: 1.06 MG/L
DSDNA AB SER-ACNC: <12 IU/ML
GFR/BSA.PRED SERPLBLD CYS-BASED-ARV: 66 ML/MIN/1.73M2
RPR SER-TITR: NORMAL

## 2022-11-11 ENCOUNTER — RESULT REVIEW (OUTPATIENT)
Age: 67
End: 2022-11-11

## 2022-11-11 LAB — GBM AB TITR SER IF: <0.2

## 2022-11-27 NOTE — PHYSICAL EXAM
[General Appearance - Alert] : alert [General Appearance - In No Acute Distress] : in no acute distress [Extraocular Movements] : extraocular movements were intact [Jugular Venous Distention Increased] : there was no jugular-venous distention [] : no respiratory distress [Respiration, Rhythm And Depth] : normal respiratory rhythm and effort [Exaggerated Use Of Accessory Muscles For Inspiration] : no accessory muscle use [Auscultation Breath Sounds / Voice Sounds] : lungs were clear to auscultation bilaterally [Heart Rate And Rhythm] : heart rate was normal and rhythm regular [Heart Sounds] : normal S1 and S2 [Edema] : there was no peripheral edema [No CVA Tenderness] : no ~M costovertebral angle tenderness [Abnormal Walk] : normal gait [Musculoskeletal - Swelling] : no joint swelling seen [FreeTextEntry1] : warm and dry  [No Focal Deficits] : no focal deficits [Oriented To Time, Place, And Person] : oriented to person, place, and time

## 2022-11-27 NOTE — ASSESSMENT
[FreeTextEntry1] : 66 yo woman w/ PMHx of breast cancer and BRCA 2 mutation, on 12/20/05 underwent bilateral mastectomy with reconstruction and partial hysterectomy and BSO all benign. S/p adjuvant chemotherapy w dd/ AC x 4 and taxol x 4 followed by RT and tamoxifen x10 years completed in 2018. She was referred by Dr Cotto for evaluation of proteinuria. Patient has history of nephrolithiasis and chronic hematuria evaluated and followed by Urology Dr Gonzalez.\par \par all available records, labs, images and medication list reviewed in details \par \par #Proteinuria in setting of moderate hematuria, no protein quantification, baseline creatinine 0.7-0.9\par - obtain urinalysis w/ micro, urine pcr today\par - obtain renal/ bladder ultrasound \par - will obtain GN workup if warranted\par - will consider renal biopsy if indicated \par - keep healthy balanced diet and weight control\par - maintain adequate hydration\par - avoid nephrotoxins/ NSAIDs \par  \par #Hematuria - following by  Dr Gonzalez, will request records \par - obtain urinalysis w/ micro, urine pcr today\par - obtain renal/ bladder ultrasound \par - will obtain GN workup if warranted\par \par #Nephrolithiasis - following by  Dr Gonzalez, will request records \par - obtain renal/ bladder ultrasound \par - consider Litholink \par - keep low salt, low oxalate diet \par - adequate hydration to maintain at least 2.0 L a day of urine output \par \par #Vit D deficiency with hx of osteoporosis - s/p recent 2nd shot of Prolia \par - continue Vit D 60195 iu weekly \par - maintain adequate alimentary calcium intake \par \par follow up in 2 weeks

## 2022-11-27 NOTE — HISTORY OF PRESENT ILLNESS
[FreeTextEntry1] : 68 yo woman w/ PMHx of breast cancer and BRCA 2 mutation, on 12/20/05 underwent bilateral mastectomy with reconstruction and partial hysterectomy and BSO all benign. S/p adjuvant chemotherapy w dd/ AC x 4 and taxol x 4 followed by RT and tamoxifen x10 years completed in 2018. She was referred by Dr Cotto for evaluation of proteinuria. Patient has history of nephrolithiasis and chronic hematuria evaluated and followed by Urology Dr Gonzalez.\par \par no recent acute illnesses\par no fever, chills, sore throat\par no cp, sob, edema\par no n/v/d, abdominal or flank pain\par no changes in urination, dysuria or hematuria\par no joint swelling, muscle aches, rash \par \par denies history of diabetes or hypertension\par NSAIDs use for right hand and left foot fx in May 2022\par continue NSAIDs prn 1-2 times a day 5 times in past 3 weeks\par s/p recent 2nd shot of Prolia \par \par admits to ~32 oz a day of fluids intake \par \par denies smoking, or ilicit drugs, social EtOH

## 2023-03-20 ENCOUNTER — APPOINTMENT (OUTPATIENT)
Dept: PULMONOLOGY | Facility: CLINIC | Age: 68
End: 2023-03-20
Payer: COMMERCIAL

## 2023-03-20 VITALS
HEART RATE: 77 BPM | DIASTOLIC BLOOD PRESSURE: 70 MMHG | OXYGEN SATURATION: 98 % | TEMPERATURE: 96.7 F | WEIGHT: 149 LBS | SYSTOLIC BLOOD PRESSURE: 120 MMHG | BODY MASS INDEX: 25.44 KG/M2 | HEIGHT: 64 IN

## 2023-03-20 LAB — EPWORTH SCORE: 4

## 2023-03-20 PROCEDURE — 99204 OFFICE O/P NEW MOD 45 MIN: CPT

## 2023-03-20 NOTE — PHYSICAL EXAM
[No Acute Distress] : no acute distress [Well Nourished] : well nourished [Well Developed] : well developed [Well-Appearing] : well-appearing [Normal Sclera/Conjunctiva] : normal sclera/conjunctiva [PERRL] : pupils equal round and reactive to light [EOMI] : extraocular movements intact [Normal Outer Ear/Nose] : the outer ears and nose were normal in appearance [No JVD] : no jugular venous distention [No Lymphadenopathy] : no lymphadenopathy [Supple] : supple [Thyroid Normal, No Nodules] : the thyroid was normal and there were no nodules present [No Respiratory Distress] : no respiratory distress  [No Accessory Muscle Use] : no accessory muscle use [Clear to Auscultation] : lungs were clear to auscultation bilaterally [Normal Rate] : normal rate  [Regular Rhythm] : with a regular rhythm [Normal S1, S2] : normal S1 and S2 [No Murmur] : no murmur heard [No Carotid Bruits] : no carotid bruits [No Abdominal Bruit] : a ~M bruit was not heard ~T in the abdomen [No Varicosities] : no varicosities [Pedal Pulses Present] : the pedal pulses are present [No Edema] : there was no peripheral edema [No Palpable Aorta] : no palpable aorta [No Extremity Clubbing/Cyanosis] : no extremity clubbing/cyanosis [Soft] : abdomen soft [Non Tender] : non-tender [Non-distended] : non-distended [No Masses] : no abdominal mass palpated [No HSM] : no HSM [Normal Bowel Sounds] : normal bowel sounds [Normal Posterior Cervical Nodes] : no posterior cervical lymphadenopathy [Normal Anterior Cervical Nodes] : no anterior cervical lymphadenopathy [No Focal Deficits] : no focal deficits [Normal Gait] : normal gait [Normal Affect] : the affect was normal [Normal Insight/Judgement] : insight and judgment were intact [de-identified] : mallampati 3

## 2023-03-20 NOTE — HISTORY OF PRESENT ILLNESS
[FreeTextEntry1] : Evaluation for dyspnea and possible sleep apnea. [de-identified] :   68-year-old female non-smoker who complains of the past 3 months fatigue, dyspnea, lightheadedness.  She was seen by her primary doctor and was scheduled for a sleep study which she has not had.  She admits to loud snoring but no witnessed apneas.  She sleeps from 11-7 30 and wakes up 1 time per night.  She feels there is a flap in her throat when she lies on her back.  She also complains of swelling in her nose at nighttime.  She is not rested in the morning.  She has been somewhat sleepy for the past few months but her Woodbridge score is only 4.  She also complains of shortness of breath at times walking up 2 flights of stairs or at times even less than a block.  However she is able to exercise on an exercise bike for 30 minutes usually without difficulty.  Occasionally she feels short of breath exercising and will stop after 15 to 20 minutes.  She denies cough or wheezing.  Her weight is stable at 5 feet 4 weight 150.  Past medical history of hyperlipidemia and breast cancer in 2005.  Current O2 sat 99 on room air.  Medications were reviewed.

## 2023-03-20 NOTE — ASSESSMENT
[FreeTextEntry1] :   The etiology of patient's dyspnea on exertion is unclear.  Patient will be referred for a chest x-ray and complete PFTs.  In terms of her loud snoring patient has been scheduled for a sleep study.  She will call me for the result.  No medication has been given.  I doubt we will find an etiology for her dyspnea.  Patient is instructed to continue to exercise as much as able.

## 2023-03-24 ENCOUNTER — RESULT REVIEW (OUTPATIENT)
Age: 68
End: 2023-03-24

## 2023-04-03 ENCOUNTER — RESULT REVIEW (OUTPATIENT)
Age: 68
End: 2023-04-03

## 2023-04-03 ENCOUNTER — APPOINTMENT (OUTPATIENT)
Dept: HEMATOLOGY ONCOLOGY | Facility: CLINIC | Age: 68
End: 2023-04-03
Payer: COMMERCIAL

## 2023-04-03 VITALS
DIASTOLIC BLOOD PRESSURE: 58 MMHG | BODY MASS INDEX: 25.8 KG/M2 | RESPIRATION RATE: 16 BRPM | WEIGHT: 151.13 LBS | HEIGHT: 64 IN | TEMPERATURE: 96.8 F | HEART RATE: 69 BPM | SYSTOLIC BLOOD PRESSURE: 115 MMHG | OXYGEN SATURATION: 99 %

## 2023-04-03 DIAGNOSIS — R06.09 OTHER FORMS OF DYSPNEA: ICD-10-CM

## 2023-04-03 PROCEDURE — 99214 OFFICE O/P EST MOD 30 MIN: CPT | Mod: 25

## 2023-04-03 PROCEDURE — 36415 COLL VENOUS BLD VENIPUNCTURE: CPT

## 2023-04-03 NOTE — HISTORY OF PRESENT ILLNESS
[Disease: _____________________] : Disease: [unfilled] [AJCC Stage: ____] : AJCC Stage: [unfilled] [Home] : at home, [unfilled] , at the time of the visit. [Medical Office: ([facility])___] : at the medical office located in  [Verbal consent obtained from patient] : the patient, [unfilled] [de-identified] : 66 year old female presents today for initial consultation of a previous history of breast cancer, referred by Dr. Tenorio.  Patient was diagnosed approximately 15 years ago () with breast cancer and BRCA 2 mutation.  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  S/p adjuvant chemotherapy w dd/ AC x 4 and taxol x 4 followed b RT and tamoxifen x 10 years. \par \par She subsequently tested positive for BRCA 2 mutation and on 2005 underwent bilateral mastectomy with reconstruction and partial hysterectomy and BSO all benign.   \par \par Completed Tamoxifen \par \par BMD Dec 2019- osteopenia T-score -2.4 on oral Ibandronate q month \par \par \par Risk Factors:\par Age at menarche- 16\par Age at menopause- partial hysterectomy with bilateral oophorectomy \par G6, P3 ( miscarriages)\par Age at first live birth- 28\par OCP- yes\par HRT- denies\par Family History- father with MM  at age 70, paternal uncle  of lung cancer, PGGM stomach cancer, sister and brother and 2 nephews BRCA positive \par Genetics- BRCA 2 positive ()\par Smoker-denies\par Ashkenazi Christianity- yes   [de-identified] : BRCA2 [de-identified] : Patient is here is follow up for breast cancer. She had a lithotripsy back in Jan 2022 and she recently had a MRI of her pancreas at [facility] with [facility] on Feb 21st, 2022 for a study. Recently the patient complains of bone pains localized mostly in the hips, thighs and shoulders which was more noticeable when she was taking Ibandronate. She was also recently placed on a statin for her cholesterol which she states contributed to more bone and joint pain. She was switched to another statin which was tolerated better.

## 2023-04-03 NOTE — ASSESSMENT
[FreeTextEntry1] : 68 year old female seen for consultation with history of breast cancer, referred by Dr. Davis.  \par Patient was diagnosed approximately 15 years ago (2005) with breast cancer and BRCA 2 mutation.  She was diagnosed with stage IIB ER/LA positive, Her 2 negative IDC.  S/p adjuvant chemotherapy w dd/ AC x 4 and taxol x 4 followed b RT and tamoxifen x 10 years. \par \par She subsequently tested positive for BRCA 2 mutation and on 12/20/2005 underwent bilateral mastectomy with reconstruction and partial hysterectomy and BSO all benign.   \par Silicone reconstruction - MRI March 2022- GREGORY, silicone implants intact. \par Completed Tamoxifen 2018\par \par last bone density March 2022 \par T-score - 2.5\par Risk factors: AI, postmenopausal \par Recommended:\par 1. Vitamin D\par 2. Calcium supplement 500mg\par 3. Weight bearing exercises\par h/o ibandronate\par prolia 4/22, 10/22, 4/23\par dental care q 6 months \par Ifex ordered\par \par S/p fall - fx hand, recovering now.\par \par Patient increased bone pains- possible 2nd to statins\par Evaluated with immunofixation/immunoglobins, urine revealed nephrotic range proteinuria. Proteins are polyclonal, no evidence of BJ protein, patient seen and evaluated by Dr. Charlton.\par She c/o lower abdominal pain and had pink urine this am, had kidneys stones in the past that required lithotripsy.  SHe is en route now but explained that if she develops fever or chills needs to report to ER.\par Patient suffers from anxiety- palliative care evaluation, uses CBD intermittently.  \par \par # Patient in pancreatic screening program MRI and endoscopic ultrasound with digestive disease of [place] [phone] at [facility] - 3/2023- WNL \par \par Colonoscopy 2021\par EUS 2022\par \par CANALES - undergoing evaluation, PFTs - slightly decreased DLCO. ECHO ordered. \par \par Blood drawn in office. Ordered and reviewed.\par

## 2023-04-03 NOTE — PHYSICAL EXAM
[Fully active, able to carry on all pre-disease performance without restriction] : Status 0 - Fully active, able to carry on all pre-disease performance without restriction [Normal] : affect appropriate [de-identified] : mastectomy

## 2023-10-03 ENCOUNTER — APPOINTMENT (OUTPATIENT)
Dept: HEMATOLOGY ONCOLOGY | Facility: CLINIC | Age: 68
End: 2023-10-03
Payer: COMMERCIAL

## 2023-10-03 ENCOUNTER — RESULT REVIEW (OUTPATIENT)
Age: 68
End: 2023-10-03

## 2023-10-03 VITALS
RESPIRATION RATE: 16 BRPM | BODY MASS INDEX: 24.68 KG/M2 | TEMPERATURE: 97.7 F | HEART RATE: 73 BPM | HEIGHT: 64 IN | SYSTOLIC BLOOD PRESSURE: 135 MMHG | WEIGHT: 144.56 LBS | DIASTOLIC BLOOD PRESSURE: 76 MMHG | OXYGEN SATURATION: 99 %

## 2023-10-03 DIAGNOSIS — F41.9 ANXIETY DISORDER, UNSPECIFIED: ICD-10-CM

## 2023-10-03 PROCEDURE — 99214 OFFICE O/P EST MOD 30 MIN: CPT | Mod: 25

## 2023-10-03 PROCEDURE — 36415 COLL VENOUS BLD VENIPUNCTURE: CPT

## 2023-12-08 ENCOUNTER — RESULT REVIEW (OUTPATIENT)
Age: 68
End: 2023-12-08

## 2024-01-18 ENCOUNTER — NON-APPOINTMENT (OUTPATIENT)
Age: 69
End: 2024-01-18

## 2024-03-04 ENCOUNTER — NON-APPOINTMENT (OUTPATIENT)
Age: 69
End: 2024-03-04

## 2024-04-11 ENCOUNTER — RESULT REVIEW (OUTPATIENT)
Age: 69
End: 2024-04-11

## 2024-04-11 ENCOUNTER — APPOINTMENT (OUTPATIENT)
Dept: HEMATOLOGY ONCOLOGY | Facility: CLINIC | Age: 69
End: 2024-04-11
Payer: COMMERCIAL

## 2024-04-11 VITALS
WEIGHT: 148.31 LBS | RESPIRATION RATE: 16 BRPM | BODY MASS INDEX: 25.32 KG/M2 | OXYGEN SATURATION: 99 % | DIASTOLIC BLOOD PRESSURE: 75 MMHG | TEMPERATURE: 97.1 F | HEIGHT: 64 IN | SYSTOLIC BLOOD PRESSURE: 138 MMHG | HEART RATE: 79 BPM

## 2024-04-11 DIAGNOSIS — Z15.09 GENETIC SUSCEPTIBILITY TO MALIGNANT NEOPLASM OF BREAST: ICD-10-CM

## 2024-04-11 DIAGNOSIS — R53.83 OTHER FATIGUE: ICD-10-CM

## 2024-04-11 DIAGNOSIS — Z15.01 GENETIC SUSCEPTIBILITY TO MALIGNANT NEOPLASM OF BREAST: ICD-10-CM

## 2024-04-11 DIAGNOSIS — M81.0 AGE-RELATED OSTEOPOROSIS W/OUT CURRENT PATHOLOGICAL FRACTURE: ICD-10-CM

## 2024-04-11 DIAGNOSIS — C50.919 MALIGNANT NEOPLASM OF UNSPECIFIED SITE OF UNSPECIFIED FEMALE BREAST: ICD-10-CM

## 2024-04-11 PROCEDURE — 36415 COLL VENOUS BLD VENIPUNCTURE: CPT

## 2024-04-11 PROCEDURE — 99214 OFFICE O/P EST MOD 30 MIN: CPT

## 2024-04-11 NOTE — HISTORY OF PRESENT ILLNESS
[Disease: _____________________] : Disease: [unfilled] [AJCC Stage: ____] : AJCC Stage: [unfilled] [de-identified] : 66 year old female presents today for initial consultation of a previous history of breast cancer, referred by Dr. Tenorio.  Patient was diagnosed approximately 15 years ago () with breast cancer and BRCA 2 mutation.  She was diagnosed with stage IIB ER/FL positive, Her 2 negative IDC.  S/p adjuvant chemotherapy w dd/ AC x 4 and taxol x 4 followed b RT and tamoxifen x 10 years. \par  \par  She subsequently tested positive for BRCA 2 mutation and on 2005 underwent bilateral mastectomy with reconstruction and partial hysterectomy and BSO all benign.   \par  \par  Completed Tamoxifen \par  \par  BMD Dec 2019- osteopenia T-score -2.4 on oral Ibandronate q month \par  \par  \par  Risk Factors:\par  Age at menarche- 16\par  Age at menopause- partial hysterectomy with bilateral oophorectomy \par  G6, P3 ( miscarriages)\par  Age at first live birth- 28\par  OCP- yes\par  HRT- denies\par  Family History- father with MM  at age 70, paternal uncle  of lung cancer, PGGM stomach cancer, sister and brother and 2 nephews BRCA positive \par  Genetics- BRCA 2 positive ()\par  Smoker-denies\par  Ashkenazi Sabianism- yes   [de-identified] : BRCA2 [de-identified] : Patient is here is follow up for breast cancer.  Patient overall feels well with no new issues. She had a series of illness in Dec, 2023 which was flu, and pink eye and she was placed on antibiotics and steroids.  She has a MRI of abdomen scheduled next month.  She had an EGD in Dec 2023.

## 2024-04-11 NOTE — REVIEW OF SYSTEMS
[Fatigue] : fatigue [Recent Change In Weight] : ~T recent weight change [Constipation] : constipation [Joint Pain] : joint pain [Joint Stiffness] : joint stiffness [Muscle Pain] : muscle pain [Anxiety] : anxiety [Negative] : Allergic/Immunologic [FreeTextEntry2] : severe fatigue

## 2024-04-11 NOTE — ASSESSMENT
[Patient/Caregiver unclear of wishes] : Patient/Caregiver unclear of wishes [FreeTextEntry1] : 68 year old female seen for consultation with history of breast cancer, referred by Dr. Davis.   Patient was diagnosed approximately 15 years ago (2005) with breast cancer and BRCA 2 mutation.  She was diagnosed with stage IIB ER/DC positive, Her 2 negative IDC.  S/p adjuvant chemotherapy w dd/ AC x 4 and taxol x 4 followed b RT and tamoxifen x 10 years.   She subsequently tested positive for BRCA 2 mutation and on 12/20/2005 underwent bilateral mastectomy with reconstruction and partial hysterectomy and BSO all benign.    Silicone reconstruction - MRI March 2022- GREGORY, silicone implants intact.  Completed Tamoxifen 2018  last bone density March 2022> Dec 23  T-score - 2.5> - 2.2 Risk factors: AI, postmenopausal  Recommended: 1. Vitamin D 2. Calcium supplement 500mg 3. Weight bearing exercises h/o ibandronate prolia 4/22, 10/22, 4/23, 10/23 dental care q 6 months  Ifex ordered  S/p fall - fx hand, recovering now.  Patient increased bone pains- possible 2nd to statins Evaluated with immunofixation/immunoglobins, urine revealed nephrotic range proteinuria. Proteins are polyclonal, no evidence of BJ protein, patient seen and evaluated by Dr. Charlton. She c/o lower abdominal pain and had pink urine this am, had kidneys stones in the past that required lithotripsy.  SHe is en route now but explained that if she develops fever or chills needs to report to ER. Patient suffers from anxiety- palliative care evaluation, uses CBD intermittently.    # Patient in pancreatic screening program MRI and endoscopic ultrasound with digestive disease of [place] [phone] at [facility] - 3/2023- WNL  - 3 mm pancreatic lesion, stable   Colonoscopy 2021 EUS 2023  #CANALES - undergoing evaluation, PFTs - slightly decreased DLCO. ECHO ordered.   # ARF - Cr 1.2 from 0.8 - decline in EGFR - she was taking valtrex - h/o kidney stones - proteinuria - obtain US renal - Nephrology referral  OSAS   Blood drawn in office. Ordered and reviewed.  [AdvancecareDate] : 04/11/2024 [FreeTextEntry2] : Defer additional wishes

## 2024-04-11 NOTE — PHYSICAL EXAM
[Fully active, able to carry on all pre-disease performance without restriction] : Status 0 - Fully active, able to carry on all pre-disease performance without restriction [Normal] : affect appropriate [de-identified] : mastectomy

## 2024-04-19 ENCOUNTER — RESULT REVIEW (OUTPATIENT)
Age: 69
End: 2024-04-19

## 2024-04-20 ENCOUNTER — NON-APPOINTMENT (OUTPATIENT)
Age: 69
End: 2024-04-20

## 2024-04-25 ENCOUNTER — APPOINTMENT (OUTPATIENT)
Dept: HEMATOLOGY ONCOLOGY | Facility: CLINIC | Age: 69
End: 2024-04-25

## 2024-04-25 ENCOUNTER — RESULT REVIEW (OUTPATIENT)
Age: 69
End: 2024-04-25

## 2024-04-25 VITALS
DIASTOLIC BLOOD PRESSURE: 80 MMHG | HEIGHT: 64 IN | TEMPERATURE: 98.7 F | BODY MASS INDEX: 25.61 KG/M2 | SYSTOLIC BLOOD PRESSURE: 133 MMHG | WEIGHT: 150 LBS | OXYGEN SATURATION: 96 % | RESPIRATION RATE: 16 BRPM | HEART RATE: 78 BPM

## 2024-04-29 ENCOUNTER — APPOINTMENT (OUTPATIENT)
Dept: NEPHROLOGY | Facility: CLINIC | Age: 69
End: 2024-04-29
Payer: COMMERCIAL

## 2024-04-29 ENCOUNTER — LABORATORY RESULT (OUTPATIENT)
Age: 69
End: 2024-04-29

## 2024-04-29 VITALS
SYSTOLIC BLOOD PRESSURE: 112 MMHG | BODY MASS INDEX: 25.61 KG/M2 | HEIGHT: 64 IN | WEIGHT: 150 LBS | OXYGEN SATURATION: 99 % | DIASTOLIC BLOOD PRESSURE: 72 MMHG | HEART RATE: 75 BPM

## 2024-04-29 DIAGNOSIS — R80.8 OTHER PROTEINURIA: ICD-10-CM

## 2024-04-29 PROCEDURE — 99214 OFFICE O/P EST MOD 30 MIN: CPT

## 2024-04-29 RX ORDER — OXYCODONE 5 MG/1
5 TABLET ORAL
Qty: 30 | Refills: 0 | Status: DISCONTINUED | COMMUNITY
Start: 2022-05-01 | End: 2024-04-29

## 2024-04-29 RX ORDER — DENOSUMAB 60 MG/ML
60 INJECTION SUBCUTANEOUS
Refills: 0 | Status: ACTIVE | COMMUNITY

## 2024-04-29 NOTE — REASON FOR VISIT
[Initial Evaluation] : an initial evaluation [FreeTextEntry1] : vladimir on ckd, referred by her oncologist,  Dr. Cotto.

## 2024-04-29 NOTE — ASSESSMENT
[FreeTextEntry1] : # Nephrolithiasis  hx of surgical stone removal 2013 and 2019  -Renal US: sub centimeter non obstructing left renal stone  - Normal serum uric acid. UA: hematuria  - 24 hr urine stone risk analysis  - increase hydration to goal uop ~ 2L/d  - Stone diet low sodium 2g/d, normal calcium 800-1200mg/d, low animal protein. Diet rich fruit and vegetable   # HILTON  # Chronic proteinuria, ? cause, no DM, no HTN scr 1.1, baseline 0.7, egfr  57, cystatin c egfr 59 - HILTON could be from volume depletion nsaid use as patient reported recent flu -UA; large blood, rbc 3-5, protein 300,  wbc 11-20. concentrated urine with High specific gravity 1.017, no glucosuria  - Renal US: asymmetric size, enlarged right 12.3 cm, left 10 cm. no HN. No hx of HTN.  - UPCR 2g , from 0.18. No hx of DM - Elevated kappa free light chain with normal K/L ratio 1.35. elevated IgM, No monoclonal band on immunofixation -  Lab SPEP, UPEP, urine ACR, repeat UPCR - if proteinuria persist, will consider starting ACEi/ARB and discuss possible renal biopsy  - Increase hydration, avoid nephrotoxic med , nsaid/ppi, iv contrast as possible

## 2024-04-29 NOTE — HISTORY OF PRESENT ILLNESS
[FreeTextEntry1] : Has history of breast cancer 2005, with BRACA gene mutation, osteoporosis, nephrolithiasis.  Patient reported had 2 stone surgically removed 2013 and 2019. Last time she passed a stone was 2022. She stated that stone was not calcium based but could not remember type of stone.  serum creatinine rise to 1.1-1.2 , bun 28, from baseline 0.7. Proteinuria worsening, UPCR 2g. Kappa free light chain is also elevated with normal ratio, elevated IgM, and polyclonal immunofixation. She was seen By Nephrologist Dr. Charlton 2 years ago.  She believed worsening kidney function is associated to episode of renal colic.  She also reprted recent flu and had use nsaid with tylenol for her symptoms. She is currently feeling well.

## 2024-04-29 NOTE — PHYSICAL EXAM
[General Appearance - Alert] : alert [General Appearance - In No Acute Distress] : in no acute distress [Exaggerated Use Of Accessory Muscles For Inspiration] : no accessory muscle use [Heart Rate And Rhythm] : heart rate was normal and rhythm regular [Heart Sounds] : normal S1 and S2 [Abdomen Soft] : soft [Abdomen Tenderness] : non-tender [Abnormal Walk] : normal gait [Oriented To Time, Place, And Person] : oriented to person, place, and time [Impaired Insight] : insight and judgment were intact

## 2024-04-29 NOTE — REVIEW OF SYSTEMS
[Fever] : no fever [Chills] : no chills [Chest Pain] : no chest pain [Palpitations] : no palpitations [Cough] : no cough [SOB on Exertion] : no shortness of breath during exertion [Constipation] : no constipation [Diarrhea] : no diarrhea [Dysuria] : no dysuria [Incontinence] : no incontinence [Dizziness] : no dizziness

## 2024-05-03 LAB
ALBUMIN MFR SERPL ELPH: 61.5 %
ALBUMIN SERPL-MCNC: 4.3 G/DL
ALBUMIN/GLOB SERPL: 1.6 RATIO
ALBUPE: 27.6 %
ALPHA1 GLOB MFR SERPL ELPH: 3.9 %
ALPHA1 GLOB SERPL ELPH-MCNC: 0.3 G/DL
ALPHA1UPE: 20.9 %
ALPHA2 GLOB MFR SERPL ELPH: 10.4 %
ALPHA2 GLOB SERPL ELPH-MCNC: 0.7 G/DL
ALPHA2UPE: 21.3 %
APPEARANCE: CLEAR
B-GLOBULIN MFR SERPL ELPH: 10.7 %
B-GLOBULIN SERPL ELPH-MCNC: 0.7 G/DL
BACTERIA: ABNORMAL /HPF
BETAUPE: 22.7 %
BILIRUBIN URINE: NEGATIVE
BLOOD URINE: ABNORMAL
CALCIUM OXALATE CRYSTALS: PRESENT
CAST: 4 /LPF
COLOR: YELLOW
CREAT SPEC-SCNC: 48 MG/DL
CREAT SPEC-SCNC: 48 MG/DL
CREAT/PROT UR: 1.7 RATIO
EPITHELIAL CELLS: 2 /HPF
ESTIMATED AVERAGE GLUCOSE: 114 MG/DL
GAMMA GLOB FLD ELPH-MCNC: 0.9 G/DL
GAMMA GLOB MFR SERPL ELPH: 13.5 %
GAMMAUPE: 7.5 %
GLUCOSE QUALITATIVE U: NEGATIVE MG/DL
HBA1C MFR BLD HPLC: 5.6 %
IGA 24H UR QL IFE: NORMAL
INTERPRETATION SERPL IEP-IMP: NORMAL
KAPPA LC 24H UR QL: NORMAL
KETONES URINE: NEGATIVE MG/DL
LEUKOCYTE ESTERASE URINE: NEGATIVE
MICROALBUMIN 24H UR DL<=1MG/L-MCNC: 19.8 MG/DL
MICROALBUMIN/CREAT 24H UR-RTO: 414 MG/G
MICROSCOPIC-UA: NORMAL
NITRITE URINE: POSITIVE
PH URINE: 6.5
PROT PATTERN 24H UR ELPH-IMP: NORMAL
PROT SERPL-MCNC: 7 G/DL
PROT SERPL-MCNC: 7 G/DL
PROT UR-MCNC: 75 MG/DL
PROT UR-MCNC: 75 MG/DL
PROT UR-MCNC: 83 MG/DL
PROTEIN URINE: 100 MG/DL
RED BLOOD CELLS URINE: 1 /HPF
REVIEW: NORMAL
SPECIFIC GRAVITY URINE: 1.01
UROBILINOGEN URINE: 1 MG/DL
WHITE BLOOD CELLS URINE: 3 /HPF

## 2024-05-03 RX ORDER — ROSUVASTATIN CALCIUM 40 MG/1
40 TABLET, FILM COATED ORAL
Qty: 90 | Refills: 0 | Status: DISCONTINUED | COMMUNITY
Start: 2022-10-25 | End: 2024-05-03

## 2024-06-14 ENCOUNTER — RESULT REVIEW (OUTPATIENT)
Age: 69
End: 2024-06-14

## 2024-06-14 ENCOUNTER — APPOINTMENT (OUTPATIENT)
Dept: NEPHROLOGY | Facility: CLINIC | Age: 69
End: 2024-06-14
Payer: COMMERCIAL

## 2024-06-14 DIAGNOSIS — N17.9 ACUTE KIDNEY FAILURE, UNSPECIFIED: ICD-10-CM

## 2024-06-14 PROCEDURE — 36415 COLL VENOUS BLD VENIPUNCTURE: CPT

## 2024-06-21 ENCOUNTER — LABORATORY RESULT (OUTPATIENT)
Age: 69
End: 2024-06-21

## 2024-06-21 ENCOUNTER — APPOINTMENT (OUTPATIENT)
Dept: NEPHROLOGY | Facility: CLINIC | Age: 69
End: 2024-06-21
Payer: COMMERCIAL

## 2024-06-21 VITALS
DIASTOLIC BLOOD PRESSURE: 74 MMHG | BODY MASS INDEX: 26.63 KG/M2 | SYSTOLIC BLOOD PRESSURE: 122 MMHG | WEIGHT: 156 LBS | HEIGHT: 64 IN

## 2024-06-21 DIAGNOSIS — R31.9 HEMATURIA, UNSPECIFIED: ICD-10-CM

## 2024-06-21 DIAGNOSIS — R82.994 HYPERCALCIURIA: ICD-10-CM

## 2024-06-21 DIAGNOSIS — N18.31 CHRONIC KIDNEY DISEASE, STAGE 3A: ICD-10-CM

## 2024-06-21 DIAGNOSIS — N20.0 CALCULUS OF KIDNEY: ICD-10-CM

## 2024-06-21 PROCEDURE — 99214 OFFICE O/P EST MOD 30 MIN: CPT

## 2024-06-21 RX ORDER — ASPIRIN 81 MG/1
81 TABLET, CHEWABLE ORAL
Refills: 0 | Status: DISCONTINUED | COMMUNITY
End: 2024-06-21

## 2024-06-21 RX ORDER — HYDROCHLOROTHIAZIDE 12.5 MG/1
12.5 CAPSULE ORAL DAILY
Qty: 90 | Refills: 0 | Status: ACTIVE | COMMUNITY
Start: 2024-06-21 | End: 1900-01-01

## 2024-06-21 RX ORDER — OXYCODONE AND ACETAMINOPHEN 5; 325 MG/1; MG/1
5-325 TABLET ORAL
Qty: 20 | Refills: 0 | Status: ACTIVE | COMMUNITY
Start: 2024-06-21

## 2024-06-21 RX ORDER — ERGOCALCIFEROL (VITAMIN D2) 1250 MCG
50000 CAPSULE ORAL
Refills: 0 | Status: ACTIVE | COMMUNITY

## 2024-06-21 RX ORDER — PRAVASTATIN SODIUM 10 MG/1
10 TABLET ORAL DAILY
Qty: 30 | Refills: 0 | Status: DISCONTINUED | COMMUNITY
Start: 2024-05-06 | End: 2024-06-21

## 2024-06-21 RX ORDER — ROSUVASTATIN CALCIUM 40 MG/1
40 TABLET, FILM COATED ORAL DAILY
Refills: 0 | Status: ACTIVE | COMMUNITY

## 2024-06-21 RX ORDER — ERGOCALCIFEROL 1.25 MG/1
1.25 MG CAPSULE ORAL
Qty: 12 | Refills: 0 | Status: DISCONTINUED | COMMUNITY
Start: 2022-10-27 | End: 2024-06-21

## 2024-06-21 RX ORDER — DOCUSATE SODIUM 100 MG/1
100 CAPSULE, LIQUID FILLED ORAL
Qty: 60 | Refills: 0 | Status: DISCONTINUED | COMMUNITY
Start: 2022-05-01 | End: 2024-06-21

## 2024-06-21 RX ORDER — VIT A/VIT C/VIT E/ZINC/COPPER 4296-226
CAPSULE ORAL
Refills: 0 | Status: DISCONTINUED | COMMUNITY
End: 2024-06-21

## 2024-06-21 RX ORDER — OMEGA-3/DHA/EPA/FISH OIL 1200 MG
1200 CAPSULE ORAL
Refills: 0 | Status: ACTIVE | COMMUNITY

## 2024-06-21 RX ORDER — IBUPROFEN 800 MG/1
800 TABLET, FILM COATED ORAL
Qty: 90 | Refills: 0 | Status: DISCONTINUED | COMMUNITY
Start: 2022-05-01 | End: 2024-06-21

## 2024-06-21 RX ORDER — SENNOSIDES 8.6 MG
8.6 TABLET ORAL
Qty: 14 | Refills: 0 | Status: DISCONTINUED | COMMUNITY
Start: 2022-05-01 | End: 2024-06-21

## 2024-06-21 NOTE — HISTORY OF PRESENT ILLNESS
[FreeTextEntry1] : Has history of breast cancer 2005, with BRACA gene mutation, osteoporosis, nephrolithiasis.  Patient reported had 2 stone surgically removed 2013 and 2019. Last time she passed a stone was 2022. She stated that stone was not calcium based but could not remember type of stone.  serum creatinine rise to 1.1-1.2 , bun 28, from baseline 0.7. Proteinuria worsening, UPCR 2g. She believed worsening kidney function is associated to episode of renal colic.  She also reprted recent flu and had use nsaid with tylenol for her symptoms. She is currently feeling well.  6/21 Completed 24 hrs urine stone risk, result reviewed and discussed with her. She is at risk for calcium oxalate stone.  Uvolume: 2.7L Urince calcium: 337 mg/d, citrate: 758, oxalate 21, SS CaOx 5.29 (high), SS CaPhos 1.42, pH: 6.2, uric acid 629  She is complaining of left flank pain, believed that she is passing a stone. Used to take Advil, which had helped but now cannot take it because of her ckd.  Denies gross hematuria. UA with microscopic hematuria, stated she always have blood in the urine.  Proteinuria improved down to 1g from 2.

## 2024-06-21 NOTE — PHYSICAL EXAM
[General Appearance - Alert] : alert [General Appearance - In No Acute Distress] : in no acute distress [] : no respiratory distress [Respiration, Rhythm And Depth] : normal respiratory rhythm and effort [Exaggerated Use Of Accessory Muscles For Inspiration] : no accessory muscle use [Auscultation Breath Sounds / Voice Sounds] : lungs were clear to auscultation bilaterally [Heart Rate And Rhythm] : heart rate was normal and rhythm regular [Heart Sounds] : normal S1 and S2 [Edema] : there was no peripheral edema [Abdomen Soft] : soft [Abdomen Tenderness] : non-tender [Abnormal Walk] : normal gait [Oriented To Time, Place, And Person] : oriented to person, place, and time

## 2024-06-21 NOTE — ASSESSMENT
[FreeTextEntry1] : # Nephrolithiasis  hx of surgical stone removal 2013 and 2019  -Renal US: sub centimeter non obstructing left renal stone . Currently has left flank pain. She also had pass a stone since the last visit, could not retrieved it.  - Normal serum uric acid. UA: hematuria. reported always has microscopic hematuria. - 24 hr urine stone risk analysis: high risk for calcium oxalate stone, hypercalciuria. start HCTZ 12.5 mg daily  - cont hydration 24 hr urine vol 2.7L - Stone diet low sodium 2g/d, normal calcium 800-1200mg/d, low animal protein. Diet rich fruit and vegetable   # HILTON /ckd 3 # Chronic proteinuria, ? cause, no DM, no HTN scr 1.1, baseline 0.7, egfr  57, cystatin c egfr 59. scr 0.9 - HILTON could be from volume depletion nsaid use as patient reported recent flu -UA; large blood, rbc 3-5, protein 300,  wbc 11-20. concentrated urine with High specific gravity 1.017, no glucosuria  - Hematuria can be related to kidney stone, since reported persistent micro hematuria, will send MARY LOU, c3,c4, ANCA...  Lilliam genetic testing for [place]/thin basement membrane - Renal US: asymmetric size, enlarged right 12.3 cm, left 10 cm. no HN. No hx of HTN.  - UPCR 2g down to 1g. No hx of DM.  - Elevated kappa free light chain with normal K/L ratio 1.35. elevated IgM, No monoclonal band on immunofixation. cont FU with hem/onc - Increase hydration, avoid nephrotoxic med, nsaid/ppi, iv contrast as possible. can take Percocet for renal colic 5mg/325 mg 1 tab q 12 PRN

## 2024-06-21 NOTE — REVIEW OF SYSTEMS
[Fever] : no fever [Chills] : no chills [Chest Pain] : no chest pain [Palpitations] : no palpitations [Cough] : no cough [SOB on Exertion] : no shortness of breath during exertion [Constipation] : no constipation [Diarrhea] : no diarrhea [Dysuria] : no dysuria [Dizziness] : no dizziness

## 2024-06-25 LAB
ALBUMIN SERPL ELPH-MCNC: 4.4 G/DL
ALP BLD-CCNC: 71 U/L
ALT SERPL-CCNC: 32 U/L
ANA SER IF-ACNC: NEGATIVE
ANION GAP SERPL CALC-SCNC: 14 MMOL/L
AST SERPL-CCNC: 13 U/L
BASOPHILS # BLD AUTO: 0.04 K/UL
BASOPHILS NFR BLD AUTO: 0.6 %
BILIRUB SERPL-MCNC: 1.4 MG/DL
BUN SERPL-MCNC: 28 MG/DL
C3 SERPL-MCNC: 111 MG/DL
C4 SERPL-MCNC: 25 MG/DL
CALCIUM SERPL-MCNC: 9.9 MG/DL
CHLORIDE SERPL-SCNC: 105 MMOL/L
CK SERPL-CCNC: 68 U/L
CO2 SERPL-SCNC: 23 MMOL/L
CREAT SERPL-MCNC: 0.96 MG/DL
EGFR: 65 ML/MIN/1.73M2
EOSINOPHIL # BLD AUTO: 0.15 K/UL
EOSINOPHIL NFR BLD AUTO: 2.1 %
GLUCOSE SERPL-MCNC: 110 MG/DL
HCT VFR BLD CALC: 38.3 %
HGB BLD-MCNC: 12.4 G/DL
IMM GRANULOCYTES NFR BLD AUTO: 0.1 %
LYMPHOCYTES # BLD AUTO: 1.63 K/UL
LYMPHOCYTES NFR BLD AUTO: 22.8 %
MAN DIFF?: NORMAL
MCHC RBC-ENTMCNC: 29.2 PG
MCHC RBC-ENTMCNC: 32.4 GM/DL
MCV RBC AUTO: 90.3 FL
MONOCYTES # BLD AUTO: 0.97 K/UL
MONOCYTES NFR BLD AUTO: 13.6 %
NEUTROPHILS # BLD AUTO: 4.34 K/UL
NEUTROPHILS NFR BLD AUTO: 60.8 %
PLATELET # BLD AUTO: 179 K/UL
POTASSIUM SERPL-SCNC: 3.8 MMOL/L
PROT SERPL-MCNC: 7.1 G/DL
RBC # BLD: 4.24 M/UL
RBC # FLD: 12.6 %
SODIUM SERPL-SCNC: 141 MMOL/L
WBC # FLD AUTO: 7.14 K/UL

## 2024-10-18 ENCOUNTER — RESULT REVIEW (OUTPATIENT)
Age: 69
End: 2024-10-18

## 2024-10-18 ENCOUNTER — APPOINTMENT (OUTPATIENT)
Dept: NEPHROLOGY | Facility: CLINIC | Age: 69
End: 2024-10-18
Payer: COMMERCIAL

## 2024-10-18 DIAGNOSIS — N17.9 ACUTE KIDNEY FAILURE, UNSPECIFIED: ICD-10-CM

## 2024-10-18 DIAGNOSIS — N18.31 CHRONIC KIDNEY DISEASE, STAGE 3A: ICD-10-CM

## 2024-10-18 PROCEDURE — 36415 COLL VENOUS BLD VENIPUNCTURE: CPT

## 2024-10-22 RX ORDER — POTASSIUM CHLORIDE 750 MG/1
10 TABLET, FILM COATED, EXTENDED RELEASE ORAL DAILY
Qty: 3 | Refills: 0 | Status: ACTIVE | COMMUNITY
Start: 2024-10-22 | End: 1900-01-01

## 2024-10-25 ENCOUNTER — RESULT REVIEW (OUTPATIENT)
Age: 69
End: 2024-10-25

## 2024-10-25 ENCOUNTER — APPOINTMENT (OUTPATIENT)
Dept: NEPHROLOGY | Facility: CLINIC | Age: 69
End: 2024-10-25
Payer: COMMERCIAL

## 2024-10-25 ENCOUNTER — APPOINTMENT (OUTPATIENT)
Dept: ENDOCRINOLOGY | Facility: CLINIC | Age: 69
End: 2024-10-25

## 2024-10-25 ENCOUNTER — NON-APPOINTMENT (OUTPATIENT)
Age: 69
End: 2024-10-25

## 2024-10-25 VITALS
OXYGEN SATURATION: 97 % | BODY MASS INDEX: 24.41 KG/M2 | HEIGHT: 64 IN | HEART RATE: 69 BPM | DIASTOLIC BLOOD PRESSURE: 70 MMHG | WEIGHT: 143 LBS | SYSTOLIC BLOOD PRESSURE: 110 MMHG

## 2024-10-25 DIAGNOSIS — R80.9 PROTEINURIA, UNSPECIFIED: ICD-10-CM

## 2024-10-25 DIAGNOSIS — E87.6 HYPOKALEMIA: ICD-10-CM

## 2024-10-25 DIAGNOSIS — N17.9 ACUTE KIDNEY FAILURE, UNSPECIFIED: ICD-10-CM

## 2024-10-25 DIAGNOSIS — N20.0 CALCULUS OF KIDNEY: ICD-10-CM

## 2024-10-25 DIAGNOSIS — N18.9 ACUTE KIDNEY FAILURE, UNSPECIFIED: ICD-10-CM

## 2024-10-25 PROCEDURE — G2211 COMPLEX E/M VISIT ADD ON: CPT | Mod: NC

## 2024-10-25 PROCEDURE — 99214 OFFICE O/P EST MOD 30 MIN: CPT

## 2024-10-25 PROCEDURE — 99204 OFFICE O/P NEW MOD 45 MIN: CPT

## 2024-10-28 ENCOUNTER — TRANSCRIPTION ENCOUNTER (OUTPATIENT)
Age: 69
End: 2024-10-28

## 2024-11-04 ENCOUNTER — RESULT REVIEW (OUTPATIENT)
Age: 69
End: 2024-11-04

## 2024-11-04 ENCOUNTER — APPOINTMENT (OUTPATIENT)
Dept: NEPHROLOGY | Facility: CLINIC | Age: 69
End: 2024-11-04
Payer: COMMERCIAL

## 2024-11-04 VITALS
HEIGHT: 64 IN | WEIGHT: 143 LBS | BODY MASS INDEX: 24.41 KG/M2 | HEART RATE: 72 BPM | SYSTOLIC BLOOD PRESSURE: 100 MMHG | DIASTOLIC BLOOD PRESSURE: 50 MMHG | OXYGEN SATURATION: 98 %

## 2024-11-04 DIAGNOSIS — E87.6 HYPOKALEMIA: ICD-10-CM

## 2024-11-04 DIAGNOSIS — R80.9 PROTEINURIA, UNSPECIFIED: ICD-10-CM

## 2024-11-04 DIAGNOSIS — N17.9 ACUTE KIDNEY FAILURE, UNSPECIFIED: ICD-10-CM

## 2024-11-04 DIAGNOSIS — E83.39 OTHER DISORDERS OF PHOSPHORUS METABOLISM: ICD-10-CM

## 2024-11-04 DIAGNOSIS — N18.9 ACUTE KIDNEY FAILURE, UNSPECIFIED: ICD-10-CM

## 2024-11-04 DIAGNOSIS — N20.0 CALCULUS OF KIDNEY: ICD-10-CM

## 2024-11-04 DIAGNOSIS — N25.89 OTHER DISORDERS RESULTING FROM IMPAIRED RENAL TUBULAR FUNCTION: ICD-10-CM

## 2024-11-04 DIAGNOSIS — N18.31 CHRONIC KIDNEY DISEASE, STAGE 3A: ICD-10-CM

## 2024-11-04 DIAGNOSIS — R79.89 OTHER SPECIFIED ABNORMAL FINDINGS OF BLOOD CHEMISTRY: ICD-10-CM

## 2024-11-04 DIAGNOSIS — R82.994 HYPERCALCIURIA: ICD-10-CM

## 2024-11-04 DIAGNOSIS — R76.8 OTHER SPECIFIED ABNORMAL IMMUNOLOGICAL FINDINGS IN SERUM: ICD-10-CM

## 2024-11-04 PROCEDURE — 99214 OFFICE O/P EST MOD 30 MIN: CPT

## 2024-11-04 RX ORDER — POTASSIUM PHOSPHATE, MONOBASIC 500 MG/1
500 TABLET, SOLUBLE ORAL 4 TIMES DAILY
Refills: 0 | Status: DISCONTINUED | COMMUNITY
End: 2024-11-04

## 2024-11-04 RX ORDER — POTASSIUM PHOSPHATE,MONOBASIC 500 MG
500 TABLET, SOLUBLE ORAL
Qty: 270 | Refills: 1 | Status: ACTIVE | COMMUNITY
Start: 2024-11-04 | End: 1900-01-01

## 2024-11-04 RX ORDER — FAMCICLOVIR 125 MG/1
125 TABLET, FILM COATED ORAL 4 TIMES DAILY
Refills: 0 | Status: ACTIVE | COMMUNITY

## 2024-11-04 RX ORDER — MELATONIN/PYRIDOXINE HCL (B6) 10 MG-10MG
10-10 TABLET,IMMED, EXTENDED RELEASE, BIPHASIC ORAL DAILY
Refills: 0 | Status: ACTIVE | COMMUNITY

## 2024-11-04 RX ORDER — SODIUM BICARBONATE 650 MG/1
650 TABLET ORAL
Qty: 270 | Refills: 1 | Status: ACTIVE | COMMUNITY
Start: 2024-11-04 | End: 1900-01-01

## 2024-12-05 ENCOUNTER — RESULT REVIEW (OUTPATIENT)
Age: 69
End: 2024-12-05

## 2024-12-05 ENCOUNTER — APPOINTMENT (OUTPATIENT)
Dept: HEMATOLOGY ONCOLOGY | Facility: CLINIC | Age: 69
End: 2024-12-05
Payer: COMMERCIAL

## 2024-12-05 ENCOUNTER — APPOINTMENT (OUTPATIENT)
Dept: NEPHROLOGY | Facility: CLINIC | Age: 69
End: 2024-12-05
Payer: COMMERCIAL

## 2024-12-05 VITALS
BODY MASS INDEX: 25.95 KG/M2 | WEIGHT: 152 LBS | HEIGHT: 64 IN | RESPIRATION RATE: 16 BRPM | SYSTOLIC BLOOD PRESSURE: 118 MMHG | TEMPERATURE: 97.8 F | DIASTOLIC BLOOD PRESSURE: 70 MMHG | HEART RATE: 81 BPM | OXYGEN SATURATION: 97 %

## 2024-12-05 VITALS
OXYGEN SATURATION: 99 % | BODY MASS INDEX: 25.95 KG/M2 | SYSTOLIC BLOOD PRESSURE: 124 MMHG | HEART RATE: 86 BPM | WEIGHT: 152 LBS | DIASTOLIC BLOOD PRESSURE: 72 MMHG | HEIGHT: 64 IN

## 2024-12-05 DIAGNOSIS — N20.0 CALCULUS OF KIDNEY: ICD-10-CM

## 2024-12-05 DIAGNOSIS — R31.9 HEMATURIA, UNSPECIFIED: ICD-10-CM

## 2024-12-05 DIAGNOSIS — Z15.09 GENETIC SUSCEPTIBILITY TO MALIGNANT NEOPLASM OF BREAST: ICD-10-CM

## 2024-12-05 DIAGNOSIS — R76.8 OTHER SPECIFIED ABNORMAL IMMUNOLOGICAL FINDINGS IN SERUM: ICD-10-CM

## 2024-12-05 DIAGNOSIS — N17.9 ACUTE KIDNEY FAILURE, UNSPECIFIED: ICD-10-CM

## 2024-12-05 DIAGNOSIS — N25.89 OTHER DISORDERS RESULTING FROM IMPAIRED RENAL TUBULAR FUNCTION: ICD-10-CM

## 2024-12-05 DIAGNOSIS — Z15.01 GENETIC SUSCEPTIBILITY TO MALIGNANT NEOPLASM OF BREAST: ICD-10-CM

## 2024-12-05 DIAGNOSIS — R80.8 OTHER PROTEINURIA: ICD-10-CM

## 2024-12-05 DIAGNOSIS — M81.0 AGE-RELATED OSTEOPOROSIS W/OUT CURRENT PATHOLOGICAL FRACTURE: ICD-10-CM

## 2024-12-05 DIAGNOSIS — C50.919 MALIGNANT NEOPLASM OF UNSPECIFIED SITE OF UNSPECIFIED FEMALE BREAST: ICD-10-CM

## 2024-12-05 PROCEDURE — 36415 COLL VENOUS BLD VENIPUNCTURE: CPT

## 2024-12-05 PROCEDURE — 99214 OFFICE O/P EST MOD 30 MIN: CPT

## 2024-12-20 ENCOUNTER — APPOINTMENT (OUTPATIENT)
Dept: ENDOCRINOLOGY | Facility: CLINIC | Age: 69
End: 2024-12-20

## 2024-12-20 VITALS
HEIGHT: 64 IN | HEART RATE: 67 BPM | SYSTOLIC BLOOD PRESSURE: 112 MMHG | WEIGHT: 148 LBS | BODY MASS INDEX: 25.27 KG/M2 | DIASTOLIC BLOOD PRESSURE: 60 MMHG | OXYGEN SATURATION: 99 %

## 2024-12-20 PROCEDURE — 99214 OFFICE O/P EST MOD 30 MIN: CPT

## 2024-12-20 PROCEDURE — G2211 COMPLEX E/M VISIT ADD ON: CPT | Mod: NC

## 2025-01-09 ENCOUNTER — RESULT REVIEW (OUTPATIENT)
Age: 70
End: 2025-01-09

## 2025-01-27 PROBLEM — Z87.898 HISTORY OF FATIGUE: Status: RESOLVED | Noted: 2021-02-03 | Resolved: 2025-01-27

## 2025-02-06 ENCOUNTER — APPOINTMENT (OUTPATIENT)
Dept: HEMATOLOGY ONCOLOGY | Facility: CLINIC | Age: 70
End: 2025-02-06

## 2025-02-20 ENCOUNTER — RESULT REVIEW (OUTPATIENT)
Age: 70
End: 2025-02-20

## 2025-02-20 ENCOUNTER — NON-APPOINTMENT (OUTPATIENT)
Age: 70
End: 2025-02-20

## 2025-02-20 ENCOUNTER — APPOINTMENT (OUTPATIENT)
Dept: HEMATOLOGY ONCOLOGY | Facility: CLINIC | Age: 70
End: 2025-02-20
Payer: COMMERCIAL

## 2025-02-20 ENCOUNTER — APPOINTMENT (OUTPATIENT)
Dept: PULMONOLOGY | Facility: CLINIC | Age: 70
End: 2025-02-20
Payer: COMMERCIAL

## 2025-02-20 VITALS
BODY MASS INDEX: 25.61 KG/M2 | OXYGEN SATURATION: 98 % | HEART RATE: 65 BPM | WEIGHT: 150 LBS | SYSTOLIC BLOOD PRESSURE: 145 MMHG | HEIGHT: 64 IN | DIASTOLIC BLOOD PRESSURE: 80 MMHG

## 2025-02-20 VITALS
BODY MASS INDEX: 45.16 KG/M2 | WEIGHT: 264.5 LBS | RESPIRATION RATE: 16 BRPM | HEIGHT: 64 IN | HEART RATE: 61 BPM | SYSTOLIC BLOOD PRESSURE: 146 MMHG | OXYGEN SATURATION: 98 % | TEMPERATURE: 98.2 F | DIASTOLIC BLOOD PRESSURE: 78 MMHG

## 2025-02-20 DIAGNOSIS — E55.9 VITAMIN D DEFICIENCY, UNSPECIFIED: ICD-10-CM

## 2025-02-20 DIAGNOSIS — Z15.09 GENETIC SUSCEPTIBILITY TO MALIGNANT NEOPLASM OF BREAST: ICD-10-CM

## 2025-02-20 DIAGNOSIS — Z15.01 GENETIC SUSCEPTIBILITY TO MALIGNANT NEOPLASM OF BREAST: ICD-10-CM

## 2025-02-20 DIAGNOSIS — G47.33 OBSTRUCTIVE SLEEP APNEA (ADULT) (PEDIATRIC): ICD-10-CM

## 2025-02-20 DIAGNOSIS — C50.919 MALIGNANT NEOPLASM OF UNSPECIFIED SITE OF UNSPECIFIED FEMALE BREAST: ICD-10-CM

## 2025-02-20 PROCEDURE — 99214 OFFICE O/P EST MOD 30 MIN: CPT

## 2025-02-20 PROCEDURE — 36415 COLL VENOUS BLD VENIPUNCTURE: CPT

## 2025-02-20 PROCEDURE — 99213 OFFICE O/P EST LOW 20 MIN: CPT

## 2025-02-22 ENCOUNTER — NON-APPOINTMENT (OUTPATIENT)
Age: 70
End: 2025-02-22

## 2025-04-25 ENCOUNTER — APPOINTMENT (OUTPATIENT)
Dept: ENDOCRINOLOGY | Facility: CLINIC | Age: 70
End: 2025-04-25

## 2025-05-26 DIAGNOSIS — N18.31 CHRONIC KIDNEY DISEASE, STAGE 3A: ICD-10-CM

## 2025-06-03 LAB
25(OH)D3 SERPL-MCNC: 28 NG/ML
ALBUMIN SERPL ELPH-MCNC: 4.4 G/DL
ALP BLD-CCNC: 65 U/L
ALT SERPL-CCNC: 66 U/L
ANION GAP SERPL CALC-SCNC: 12 MMOL/L
AST SERPL-CCNC: 30 U/L
BILIRUB SERPL-MCNC: 1.8 MG/DL
BUN SERPL-MCNC: 13 MG/DL
CALCIUM SERPL-MCNC: 9.7 MG/DL
CALCIUM SERPL-MCNC: 9.7 MG/DL
CHLORIDE SERPL-SCNC: 102 MMOL/L
CK SERPL-CCNC: 41 U/L
CO2 SERPL-SCNC: 23 MMOL/L
CREAT SERPL-MCNC: 0.79 MG/DL
EGFRCR SERPLBLD CKD-EPI 2021: 81 ML/MIN/1.73M2
GLUCOSE SERPL-MCNC: 102 MG/DL
HCT VFR BLD CALC: 40.7 %
HGB BLD-MCNC: 13.4 G/DL
MAGNESIUM SERPL-MCNC: 2.1 MG/DL
MCHC RBC-ENTMCNC: 32.6 PG
MCHC RBC-ENTMCNC: 32.9 G/DL
MCV RBC AUTO: 99 FL
PARATHYROID HORMONE INTACT: 50 PG/ML
PHOSPHATE SERPL-MCNC: 2.3 MG/DL
PLATELET # BLD AUTO: 163 K/UL
POTASSIUM SERPL-SCNC: 3.8 MMOL/L
PROT SERPL-MCNC: 7 G/DL
RBC # BLD: 4.11 M/UL
RBC # FLD: 13.9 %
SODIUM SERPL-SCNC: 137 MMOL/L
URATE SERPL-MCNC: 4.4 MG/DL
WBC # FLD AUTO: 4.94 K/UL

## 2025-06-06 ENCOUNTER — APPOINTMENT (OUTPATIENT)
Dept: ENDOCRINOLOGY | Facility: CLINIC | Age: 70
End: 2025-06-06

## 2025-06-06 ENCOUNTER — NON-APPOINTMENT (OUTPATIENT)
Age: 70
End: 2025-06-06

## 2025-06-06 ENCOUNTER — APPOINTMENT (OUTPATIENT)
Dept: NEPHROLOGY | Facility: CLINIC | Age: 70
End: 2025-06-06
Payer: COMMERCIAL

## 2025-06-06 VITALS
SYSTOLIC BLOOD PRESSURE: 140 MMHG | OXYGEN SATURATION: 99 % | WEIGHT: 148 LBS | BODY MASS INDEX: 25.27 KG/M2 | HEART RATE: 74 BPM | DIASTOLIC BLOOD PRESSURE: 80 MMHG | HEIGHT: 64 IN

## 2025-06-06 PROCEDURE — 99214 OFFICE O/P EST MOD 30 MIN: CPT

## 2025-06-06 PROCEDURE — G2211 COMPLEX E/M VISIT ADD ON: CPT | Mod: NC

## 2025-06-06 RX ORDER — DENOSUMAB 60 MG/ML
INJECTION SUBCUTANEOUS
Refills: 0 | Status: ACTIVE | COMMUNITY

## 2025-06-10 RX ORDER — ERGOCALCIFEROL 1.25 MG/1
1.25 MG CAPSULE ORAL
Qty: 8 | Refills: 0 | Status: ACTIVE | COMMUNITY
Start: 2025-06-10 | End: 1900-01-01

## 2025-06-10 RX ORDER — VITAMIN K2 90 MCG
1000 CAPSULE ORAL DAILY
Qty: 60 | Refills: 1 | Status: DISCONTINUED | COMMUNITY
Start: 2025-06-09 | End: 2025-06-10

## 2025-06-11 RX ORDER — HYDROCHLOROTHIAZIDE 12.5 MG/1
12.5 CAPSULE ORAL DAILY
Qty: 90 | Refills: 1 | Status: ACTIVE | COMMUNITY
Start: 2025-06-08 | End: 1900-01-01

## 2025-06-20 ENCOUNTER — LABORATORY RESULT (OUTPATIENT)
Age: 70
End: 2025-06-20

## 2025-06-20 ENCOUNTER — APPOINTMENT (OUTPATIENT)
Dept: NEPHROLOGY | Facility: CLINIC | Age: 70
End: 2025-06-20
Payer: COMMERCIAL

## 2025-06-20 VITALS
OXYGEN SATURATION: 98 % | SYSTOLIC BLOOD PRESSURE: 124 MMHG | HEIGHT: 64 IN | BODY MASS INDEX: 25.61 KG/M2 | WEIGHT: 150 LBS | DIASTOLIC BLOOD PRESSURE: 70 MMHG | HEART RATE: 75 BPM

## 2025-06-20 PROCEDURE — G2211 COMPLEX E/M VISIT ADD ON: CPT | Mod: NC

## 2025-06-20 PROCEDURE — 99214 OFFICE O/P EST MOD 30 MIN: CPT

## 2025-06-20 RX ORDER — TAMSULOSIN HYDROCHLORIDE 0.4 MG/1
0.4 CAPSULE ORAL
Qty: 90 | Refills: 0 | Status: ACTIVE | COMMUNITY
Start: 2025-06-20 | End: 1900-01-01

## 2025-06-23 LAB
ANION GAP SERPL CALC-SCNC: 15 MMOL/L
APPEARANCE: CLEAR
B BURGDOR AB SER-IMP: NEGATIVE
B BURGDOR IGG+IGM SER QL: 0.03 INDEX
BILIRUBIN URINE: NEGATIVE
BLOOD URINE: ABNORMAL
BUN SERPL-MCNC: 14 MG/DL
CALCIUM SERPL-MCNC: 10.1 MG/DL
CHLORIDE SERPL-SCNC: 100 MMOL/L
CK SERPL-CCNC: 39 U/L
CO2 SERPL-SCNC: 26 MMOL/L
COLOR: YELLOW
CREAT SERPL-MCNC: 0.84 MG/DL
CYSTATIN C SERPL-MCNC: 1.22 MG/L
EGFRCR SERPLBLD CKD-EPI 2021: 76 ML/MIN/1.73M2
GFR/BSA.PRED SERPLBLD CYS-BASED-ARV: 54 ML/MIN/1.73M2
GLUCOSE QUALITATIVE U: NEGATIVE MG/DL
GLUCOSE SERPL-MCNC: 102 MG/DL
KETONES URINE: NEGATIVE MG/DL
LEUKOCYTE ESTERASE URINE: ABNORMAL
NITRITE URINE: NEGATIVE
PH URINE: 7
PHOSPHATE SERPL-MCNC: 3.9 MG/DL
POTASSIUM SERPL-SCNC: 3.9 MMOL/L
PROTEIN URINE: NEGATIVE MG/DL
SODIUM SERPL-SCNC: 140 MMOL/L
SPECIFIC GRAVITY URINE: 1.01
UROBILINOGEN URINE: 1 MG/DL

## 2025-07-03 RX ORDER — DIBASIC SODIUM PHOSPHATE, MONOBASIC POTASSIUM PHOSPHATE AND MONOBASIC SODIUM PHOSPHATE 852; 155; 130 MG/1; MG/1; MG/1
155-852-130 TABLET ORAL 3 TIMES DAILY
Qty: 180 | Refills: 0 | Status: ACTIVE | COMMUNITY
Start: 2025-07-03 | End: 1900-01-01

## 2025-07-23 ENCOUNTER — APPOINTMENT (OUTPATIENT)
Dept: NEPHROLOGY | Facility: CLINIC | Age: 70
End: 2025-07-23

## 2025-07-23 ENCOUNTER — RESULT REVIEW (OUTPATIENT)
Age: 70
End: 2025-07-23

## 2025-07-23 ENCOUNTER — APPOINTMENT (OUTPATIENT)
Dept: NEPHROLOGY | Facility: CLINIC | Age: 70
End: 2025-07-23
Payer: COMMERCIAL

## 2025-07-23 DIAGNOSIS — R76.8 OTHER SPECIFIED ABNORMAL IMMUNOLOGICAL FINDINGS IN SERUM: ICD-10-CM

## 2025-07-23 DIAGNOSIS — N18.9 ACUTE KIDNEY FAILURE, UNSPECIFIED: ICD-10-CM

## 2025-07-23 DIAGNOSIS — I10 ESSENTIAL (PRIMARY) HYPERTENSION: ICD-10-CM

## 2025-07-23 DIAGNOSIS — R80.8 OTHER PROTEINURIA: ICD-10-CM

## 2025-07-23 DIAGNOSIS — N18.31 CHRONIC KIDNEY DISEASE, STAGE 3A: ICD-10-CM

## 2025-07-23 DIAGNOSIS — N17.9 ACUTE KIDNEY FAILURE, UNSPECIFIED: ICD-10-CM

## 2025-07-23 PROCEDURE — 36415 COLL VENOUS BLD VENIPUNCTURE: CPT

## 2025-07-25 ENCOUNTER — RX RENEWAL (OUTPATIENT)
Age: 70
End: 2025-07-25

## 2025-07-29 ENCOUNTER — RX RENEWAL (OUTPATIENT)
Age: 70
End: 2025-07-29

## 2025-07-30 RX ORDER — SOD PHOS DI, MONO/K PHOS MONO 250 MG
155-852-130 TABLET ORAL
Qty: 180 | Refills: 11 | Status: ACTIVE | COMMUNITY
Start: 2025-07-25 | End: 1900-01-01

## 2025-07-30 RX ORDER — SOD PHOS DI, MONO/K PHOS MONO 250 MG
155-852-130 TABLET ORAL
Qty: 180 | Refills: 11 | Status: ACTIVE | COMMUNITY
Start: 2025-07-29 | End: 1900-01-01

## 2025-07-31 DIAGNOSIS — E55.9 VITAMIN D DEFICIENCY, UNSPECIFIED: ICD-10-CM

## 2025-08-06 RX ORDER — VITAMIN K2 90 MCG
1000 CAPSULE ORAL
Qty: 90 | Refills: 1 | Status: DISCONTINUED | COMMUNITY
Start: 2025-07-31 | End: 2025-08-06

## 2025-09-11 ENCOUNTER — RESULT REVIEW (OUTPATIENT)
Age: 70
End: 2025-09-11

## 2025-09-11 ENCOUNTER — APPOINTMENT (OUTPATIENT)
Dept: HEMATOLOGY ONCOLOGY | Facility: CLINIC | Age: 70
End: 2025-09-11
Payer: COMMERCIAL

## 2025-09-11 VITALS
HEIGHT: 64 IN | SYSTOLIC BLOOD PRESSURE: 132 MMHG | DIASTOLIC BLOOD PRESSURE: 70 MMHG | TEMPERATURE: 97.8 F | WEIGHT: 151.44 LBS | RESPIRATION RATE: 16 BRPM | OXYGEN SATURATION: 98 % | HEART RATE: 63 BPM | BODY MASS INDEX: 25.85 KG/M2

## 2025-09-11 DIAGNOSIS — C50.919 MALIGNANT NEOPLASM OF UNSPECIFIED SITE OF UNSPECIFIED FEMALE BREAST: ICD-10-CM

## 2025-09-11 DIAGNOSIS — Z15.09 GENETIC SUSCEPTIBILITY TO MALIGNANT NEOPLASM OF BREAST: ICD-10-CM

## 2025-09-11 DIAGNOSIS — Z15.01 GENETIC SUSCEPTIBILITY TO MALIGNANT NEOPLASM OF BREAST: ICD-10-CM

## 2025-09-11 DIAGNOSIS — M81.0 AGE-RELATED OSTEOPOROSIS W/OUT CURRENT PATHOLOGICAL FRACTURE: ICD-10-CM

## 2025-09-11 DIAGNOSIS — R76.8 OTHER SPECIFIED ABNORMAL IMMUNOLOGICAL FINDINGS IN SERUM: ICD-10-CM

## 2025-09-11 PROCEDURE — 36415 COLL VENOUS BLD VENIPUNCTURE: CPT

## 2025-09-11 PROCEDURE — 99214 OFFICE O/P EST MOD 30 MIN: CPT

## 2025-09-15 ENCOUNTER — APPOINTMENT (OUTPATIENT)
Dept: PULMONOLOGY | Facility: CLINIC | Age: 70
End: 2025-09-15
Payer: COMMERCIAL

## 2025-09-15 VITALS
HEART RATE: 76 BPM | WEIGHT: 151 LBS | BODY MASS INDEX: 25.78 KG/M2 | DIASTOLIC BLOOD PRESSURE: 78 MMHG | SYSTOLIC BLOOD PRESSURE: 129 MMHG | HEIGHT: 64 IN

## 2025-09-15 DIAGNOSIS — G47.33 OBSTRUCTIVE SLEEP APNEA (ADULT) (PEDIATRIC): ICD-10-CM

## 2025-09-15 PROCEDURE — 99213 OFFICE O/P EST LOW 20 MIN: CPT
